# Patient Record
Sex: MALE | Race: WHITE | NOT HISPANIC OR LATINO | Employment: OTHER | URBAN - METROPOLITAN AREA
[De-identification: names, ages, dates, MRNs, and addresses within clinical notes are randomized per-mention and may not be internally consistent; named-entity substitution may affect disease eponyms.]

---

## 2022-01-31 ENCOUNTER — LAB (OUTPATIENT)
Dept: LAB | Facility: HOSPITAL | Age: 57
End: 2022-01-31

## 2022-01-31 ENCOUNTER — HOSPITAL ENCOUNTER (OUTPATIENT)
Dept: ULTRASOUND IMAGING | Facility: HOSPITAL | Age: 57
Discharge: HOME | End: 2022-01-31

## 2022-01-31 ENCOUNTER — OFFICE VISIT (OUTPATIENT)
Dept: GASTROENTEROLOGY | Facility: CLINIC | Age: 57
End: 2022-01-31

## 2022-01-31 VITALS
DIASTOLIC BLOOD PRESSURE: 58 MMHG | SYSTOLIC BLOOD PRESSURE: 123 MMHG | WEIGHT: 271.8 LBS | HEART RATE: 92 BPM | HEIGHT: 72 IN | BODY MASS INDEX: 36.82 KG/M2

## 2022-01-31 DIAGNOSIS — R18.8 CIRRHOSIS OF LIVER WITH ASCITES, UNSPECIFIED HEPATIC CIRRHOSIS TYPE: ICD-10-CM

## 2022-01-31 DIAGNOSIS — R18.8 OTHER ASCITES: ICD-10-CM

## 2022-01-31 DIAGNOSIS — A04.8 H. PYLORI INFECTION: Primary | ICD-10-CM

## 2022-01-31 DIAGNOSIS — A04.8 H. PYLORI INFECTION: ICD-10-CM

## 2022-01-31 DIAGNOSIS — K74.60 CIRRHOSIS OF LIVER WITH ASCITES, UNSPECIFIED HEPATIC CIRRHOSIS TYPE: ICD-10-CM

## 2022-01-31 DIAGNOSIS — R10.11 RIGHT UPPER QUADRANT PAIN: ICD-10-CM

## 2022-01-31 LAB
ALBUMIN SERPL-MCNC: 4 G/DL (ref 3.5–5.2)
ALBUMIN/GLOB SERPL: 1.4 G/DL
ALP SERPL-CCNC: 53 U/L (ref 39–117)
ALT SERPL W P-5'-P-CCNC: 20 U/L (ref 1–41)
AMMONIA BLD-SCNC: 27 UMOL/L (ref 16–60)
ANION GAP SERPL CALCULATED.3IONS-SCNC: 10.5 MMOL/L (ref 5–15)
AST SERPL-CCNC: 21 U/L (ref 1–40)
BASOPHILS # BLD AUTO: 0.03 10*3/MM3 (ref 0–0.2)
BASOPHILS NFR BLD AUTO: 0.4 % (ref 0–1.5)
BILIRUB SERPL-MCNC: 0.5 MG/DL (ref 0–1.2)
BUN SERPL-MCNC: 11 MG/DL (ref 6–20)
BUN/CREAT SERPL: 12.1 (ref 7–25)
CALCIUM SPEC-SCNC: 9.2 MG/DL (ref 8.6–10.5)
CHLORIDE SERPL-SCNC: 106 MMOL/L (ref 98–107)
CO2 SERPL-SCNC: 24.5 MMOL/L (ref 22–29)
CREAT SERPL-MCNC: 0.91 MG/DL (ref 0.76–1.27)
DEPRECATED RDW RBC AUTO: 43.6 FL (ref 37–54)
EOSINOPHIL # BLD AUTO: 0.08 10*3/MM3 (ref 0–0.4)
EOSINOPHIL NFR BLD AUTO: 1.2 % (ref 0.3–6.2)
ERYTHROCYTE [DISTWIDTH] IN BLOOD BY AUTOMATED COUNT: 13.1 % (ref 12.3–15.4)
GFR SERPL CREATININE-BSD FRML MDRD: 86 ML/MIN/1.73
GLOBULIN UR ELPH-MCNC: 2.9 GM/DL
GLUCOSE SERPL-MCNC: 100 MG/DL (ref 65–99)
HCT VFR BLD AUTO: 42.4 % (ref 37.5–51)
HGB BLD-MCNC: 13.7 G/DL (ref 13–17.7)
IMM GRANULOCYTES # BLD AUTO: 0.01 10*3/MM3 (ref 0–0.05)
IMM GRANULOCYTES NFR BLD AUTO: 0.1 % (ref 0–0.5)
INR PPP: 0.96 (ref 2–3)
LYMPHOCYTES # BLD AUTO: 2.17 10*3/MM3 (ref 0.7–3.1)
LYMPHOCYTES NFR BLD AUTO: 32.3 % (ref 19.6–45.3)
MCH RBC QN AUTO: 29 PG (ref 26.6–33)
MCHC RBC AUTO-ENTMCNC: 32.3 G/DL (ref 31.5–35.7)
MCV RBC AUTO: 89.6 FL (ref 79–97)
MONOCYTES # BLD AUTO: 0.49 10*3/MM3 (ref 0.1–0.9)
MONOCYTES NFR BLD AUTO: 7.3 % (ref 5–12)
NEUTROPHILS NFR BLD AUTO: 3.93 10*3/MM3 (ref 1.7–7)
NEUTROPHILS NFR BLD AUTO: 58.7 % (ref 42.7–76)
PLATELET # BLD AUTO: 231 10*3/MM3 (ref 140–450)
PMV BLD AUTO: 9.4 FL (ref 6–12)
POTASSIUM SERPL-SCNC: 4.4 MMOL/L (ref 3.5–5.2)
PROT SERPL-MCNC: 6.9 G/DL (ref 6–8.5)
PROTHROMBIN TIME: 10.1 SECONDS (ref 9.4–12)
RBC # BLD AUTO: 4.73 10*6/MM3 (ref 4.14–5.8)
SODIUM SERPL-SCNC: 141 MMOL/L (ref 136–145)
UREA BREATH TEST QL: NEGATIVE
WBC NRBC COR # BLD: 6.71 10*3/MM3 (ref 3.4–10.8)

## 2022-01-31 PROCEDURE — 99204 OFFICE O/P NEW MOD 45 MIN: CPT | Performed by: NURSE PRACTITIONER

## 2022-01-31 PROCEDURE — 83013 H PYLORI (C-13) BREATH: CPT

## 2022-01-31 PROCEDURE — 36415 COLL VENOUS BLD VENIPUNCTURE: CPT

## 2022-01-31 PROCEDURE — 76700 US EXAM ABDOM COMPLETE: CPT

## 2022-01-31 PROCEDURE — 85025 COMPLETE CBC W/AUTO DIFF WBC: CPT | Performed by: NURSE PRACTITIONER

## 2022-01-31 PROCEDURE — 80053 COMPREHEN METABOLIC PANEL: CPT

## 2022-01-31 PROCEDURE — 82140 ASSAY OF AMMONIA: CPT

## 2022-01-31 PROCEDURE — 85610 PROTHROMBIN TIME: CPT

## 2022-01-31 RX ORDER — CYCLOBENZAPRINE HCL 10 MG
TABLET ORAL 3 TIMES DAILY PRN
COMMUNITY

## 2022-01-31 RX ORDER — PROCHLORPERAZINE 25 MG/1
SUPPOSITORY RECTAL SEE ADMIN INSTRUCTIONS
COMMUNITY
Start: 2021-12-22

## 2022-01-31 RX ORDER — ASPIRIN 81 MG/1
TABLET ORAL
COMMUNITY

## 2022-01-31 RX ORDER — SILDENAFIL 25 MG/1
TABLET, FILM COATED ORAL
COMMUNITY
End: 2022-11-23

## 2022-01-31 RX ORDER — FAMOTIDINE 40 MG/1
TABLET, FILM COATED ORAL
COMMUNITY

## 2022-01-31 RX ORDER — AZELASTINE 1 MG/ML
SPRAY, METERED NASAL
COMMUNITY

## 2022-01-31 RX ORDER — HYDROCODONE BITARTRATE AND ACETAMINOPHEN 10; 325 MG/1; MG/1
TABLET ORAL
COMMUNITY
End: 2022-07-12

## 2022-01-31 RX ORDER — TRIAMCINOLONE ACETONIDE 0.1 %
PASTE (GRAM) DENTAL
COMMUNITY
End: 2022-03-28

## 2022-01-31 RX ORDER — HYDROCORTISONE 25 MG/ML
LOTION TOPICAL
COMMUNITY

## 2022-01-31 RX ORDER — CLOBETASOL PROPIONATE 0.46 MG/ML
SOLUTION TOPICAL
COMMUNITY
Start: 2021-11-04

## 2022-01-31 RX ORDER — TRIAMCINOLONE ACETONIDE 1 MG/G
CREAM TOPICAL
COMMUNITY
End: 2022-11-23

## 2022-01-31 RX ORDER — EPINEPHRINE 0.3 MG/.3ML
INJECTION SUBCUTANEOUS
COMMUNITY

## 2022-01-31 RX ORDER — NITROGLYCERIN 0.4 MG/1
TABLET SUBLINGUAL
COMMUNITY

## 2022-01-31 RX ORDER — PRAMIPEXOLE DIHYDROCHLORIDE 0.12 MG/1
TABLET ORAL
COMMUNITY

## 2022-01-31 RX ORDER — CARBIDOPA AND LEVODOPA 25; 100 MG/1; MG/1
TABLET, EXTENDED RELEASE ORAL
COMMUNITY
End: 2022-07-12

## 2022-01-31 RX ORDER — CETIRIZINE HYDROCHLORIDE 10 MG/1
TABLET ORAL
COMMUNITY

## 2022-01-31 RX ORDER — AMOXICILLIN 500 MG/1
CAPSULE ORAL
COMMUNITY
Start: 2022-01-27 | End: 2022-07-12

## 2022-01-31 RX ORDER — TIOTROPIUM BROMIDE INHALATION SPRAY 1.56 UG/1
SPRAY, METERED RESPIRATORY (INHALATION)
COMMUNITY
Start: 2022-01-19 | End: 2022-11-23

## 2022-01-31 RX ORDER — MAGNESIUM OXIDE 400 MG/1
400 TABLET ORAL
COMMUNITY

## 2022-01-31 RX ORDER — INSULIN ASPART 100 [IU]/ML
INJECTION, SOLUTION INTRAVENOUS; SUBCUTANEOUS
COMMUNITY
End: 2022-11-23

## 2022-01-31 RX ORDER — PROCHLORPERAZINE 25 MG/1
SUPPOSITORY RECTAL
COMMUNITY
Start: 2022-01-17

## 2022-01-31 RX ORDER — LIDOCAINE 50 MG/G
OINTMENT TOPICAL
COMMUNITY
End: 2022-11-23

## 2022-01-31 RX ORDER — GABAPENTIN 400 MG/1
CAPSULE ORAL
COMMUNITY
End: 2022-07-12

## 2022-01-31 RX ORDER — PEN NEEDLE, DIABETIC 32GX 5/32"
NEEDLE, DISPOSABLE MISCELLANEOUS
COMMUNITY
Start: 2022-01-25

## 2022-01-31 RX ORDER — FLUTICASONE PROPIONATE 50 MCG
SPRAY, SUSPENSION (ML) NASAL
COMMUNITY

## 2022-01-31 RX ORDER — APREMILAST 30 MG/1
TABLET, FILM COATED ORAL
COMMUNITY

## 2022-01-31 RX ORDER — KETOCONAZOLE 20 MG/ML
SHAMPOO TOPICAL
COMMUNITY

## 2022-01-31 RX ORDER — INSULIN LISPRO 200 [IU]/ML
INJECTION, SOLUTION SUBCUTANEOUS
COMMUNITY
End: 2022-11-23

## 2022-01-31 RX ORDER — INSULIN DEGLUDEC 200 U/ML
INJECTION, SOLUTION SUBCUTANEOUS
COMMUNITY
End: 2022-11-23

## 2022-01-31 RX ORDER — ALBUTEROL SULFATE 90 UG/1
AEROSOL, METERED RESPIRATORY (INHALATION)
COMMUNITY

## 2022-01-31 RX ORDER — RABEPRAZOLE SODIUM 20 MG/1
20 TABLET, DELAYED RELEASE ORAL DAILY
Qty: 90 TABLET | Refills: 0 | Status: SHIPPED | OUTPATIENT
Start: 2022-01-31 | End: 2022-05-09 | Stop reason: SDUPTHER

## 2022-01-31 RX ORDER — CLOSTRIDIUM TETANI TOXOID ANTIGEN (FORMALDEHYDE INACTIVATED), CORYNEBACTERIUM DIPHTHERIAE TOXOID ANTIGEN (FORMALDEHYDE INACTIVATED), BORDETELLA PERTUSSIS TOXOID ANTIGEN (GLUTARALDEHYDE INACTIVATED), BORDETELLA PERTUSSIS FILAMENTOUS HEMAGGLUTININ ANTIGEN (FORMALDEHYDE INACTIVATED), BORDETELLA PERTUSSIS PERTACTIN ANTIGEN, AND BORDETELLA PERTUSSIS FIMBRIAE 2/3 ANTIGEN 5; 2; 2.5; 5; 3; 5 [LF]/.5ML; [LF]/.5ML; UG/.5ML; UG/.5ML; UG/.5ML; UG/.5ML
INJECTION, SUSPENSION INTRAMUSCULAR
COMMUNITY

## 2022-01-31 RX ORDER — RIVASTIGMINE TARTRATE 4.5 MG/1
CAPSULE ORAL
COMMUNITY

## 2022-01-31 RX ORDER — HEPATITIS A VACCINE 1440 [IU]/ML
INJECTION, SUSPENSION INTRAMUSCULAR
COMMUNITY

## 2022-01-31 RX ORDER — MELOXICAM 15 MG/1
TABLET ORAL
COMMUNITY

## 2022-01-31 RX ORDER — CALCIPOTRIENE 50 UG/G
OINTMENT TOPICAL
COMMUNITY

## 2022-01-31 RX ORDER — BENAZEPRIL HYDROCHLORIDE 10 MG/1
TABLET ORAL
COMMUNITY

## 2022-01-31 RX ORDER — MONTELUKAST SODIUM 10 MG/1
TABLET ORAL
COMMUNITY
End: 2022-11-23

## 2022-01-31 NOTE — PROGRESS NOTES
"Patient Name: Israel Grayson   Visit Date: 01/31/2022   Patient ID: [id number]  Provider: JALEN Link    Sex: male  Location:  Location Address:  Location Phone: [phone]  [facility] [address] [phone]    YOB: 1965      Primary Care Provider Patsy Stevens APRN      Referring Provider: No ref. provider found        Chief Complaint  Abnormal Lab (H. Pylori), Hepatic Disease (Liver Failure, MEHTA ), and Abdominal Pain (URQ )    History of Present Illness  Israel Grayson is a 56 y.o. who presents to [facility] on referral from No ref. provider found for a gastroenterology evaluation of Abnormal Lab (H. Pylori), Hepatic Disease (Liver Failure, MEHTA ), and Abdominal Pain (URQ ).    Mr. Grayson presents today with a complaints of recurrent H. pylori.  Was recently diagnosed several months ago at [facility].  Reports he finished the antibiotics however does not feel that infection has cleared.  Has not yet had a H. pylori breath test.  Admits to feeling bloated most days.  Severe heartburn, all day long, no matter the type of foods he eats.  Drinking 3 cans of Mountain Dew, diet daily.  Occasional nausea.  Reports he will wake up in the melanite at times in need to vomit due to the acid.  Currently taking Pepcid 40 mg.  Has failed Prilosec, Protonix, and Nexium due to no change in symptoms.    Patient also recently had cholecystectomy last year and was told his liver was cirrhotic due to MEHTA after a biopsy.  Admits to a dull right upper quadrant pain.  He is having confusion to where he \"draws blanks\" regarding things he used to do daily.  Patient does also have Parkinson's disease and is seeing a neurologist as well.  Denies any dysphagia, lower extremity swelling, or trouble sleeping at night.  He does feel that his stomach has been more distended in the last several weeks.    Having a bowel movement at least 1-2 times daily, " formed stool.  Denies any hematochezia or melena.      Labs Result Review Imaging    Past Medical History:   Diagnosis Date   • Anxiety    • Chronic headaches    • Cirrhosis (HCC)    • Depression    • Diabetes mellitus (HCC)    • Hyperlipidemia    • Hypertension    • Liver disease     Cirrhosis    • Muscle pain    • Parkinson disease (HCC)        Past Surgical History:   Procedure Laterality Date   • ANAL FISSURECTOMY     • CHOLECYSTECTOMY     • COLONOSCOPY     • HIP ARTHROPLASTY      X2 Left    • LIVER BIOPSY     • POSTERIOR LUMBAR/THORACIC SPINE FUSION     • ROTATOR CUFF REPAIR     • TONSILLECTOMY     • UPPER GASTROINTESTINAL ENDOSCOPY           Current Outpatient Medications:   •  albuterol sulfate  (90 Base) MCG/ACT inhaler, albuterol sulfate HFA 90 mcg/actuation aerosol inhaler  INHALE 2 PUFFS BY MOUTH EVERY 4 TO 6 HOURS AS NEEDED FOR COUGH WHEEZE OR SHORTNESS OF BREATH USE WITH VORTEX SPACER, Disp: , Rfl:   •  amoxicillin (AMOXIL) 500 MG capsule, , Disp: , Rfl:   •  Apremilast (Otezla) 30 MG tablet, Otezla 30 mg tablet  TAKE 2 TABLETS BY MOUTH ONCE DAILY, Disp: , Rfl:   •  aspirin (aspirin) 81 MG EC tablet, Aspir-81 mg tablet,delayed release  Take 1 tablet every day by oral route., Disp: , Rfl:   •  azelastine (ASTELIN) 0.1 % nasal spray, azelastine 137 mcg (0.1 %) nasal spray aerosol  USE 1 TO 2 SPRAY(S) IN EACH NOSTRIL TWICE DAILY AS NEEDED, Disp: , Rfl:   •  BD Pen Needle Gavi 2nd Gen 32G X 4 MM misc, USE 5 TO 7 TIMES A DAY, Disp: , Rfl:   •  benazepril (LOTENSIN) 10 MG tablet, benazepril 10 mg tablet  TAKE 1 TABLET BY MOUTH ONCE DAILY, Disp: , Rfl:   •  calcipotriene (DOVONOX) 0.005 % ointment, calcipotriene 0.005 % topical ointment, Disp: , Rfl:   •  carbidopa-levodopa (SINEMET)  MG per tablet, carbidopa 25 mg-levodopa 100 mg tablet  TAKE 1 TABLET BY MOUTH EVERY DAY AT BEDTIME, Disp: , Rfl:   •  carbidopa-levodopa ER (SINEMET CR)  MG per tablet, carbidopa ER 25 mg-levodopa 100 mg  tablet,extended release  TAKE 1 TABLET BY MOUTH EVERY DAY AT BEDTIME, Disp: , Rfl:   •  cetirizine (zyrTEC) 10 MG tablet, cetirizine 10 mg tablet  TAKE 1 TABLET BY MOUTH ONCE DAILY, Disp: , Rfl:   •  Cholecalciferol 50 MCG (2000 UT) tablet, Take 1 tablet by mouth Daily., Disp: , Rfl:   •  clobetasol (TEMOVATE) 0.05 % external solution, , Disp: , Rfl:   •  Continuous Blood Gluc Sensor (Dexcom G6 Sensor), , Disp: , Rfl:   •  Continuous Blood Gluc Transmit (Dexcom G6 Transmitter) misc, See Admin Instructions., Disp: , Rfl:   •  Cyanocobalamin 1000 MCG sublingual tablet, cyanocobalamin (vit B-12) 1,000 mcg sublingual tablet  Place 1 tablet every day by sublingual route., Disp: , Rfl:   •  cyclobenzaprine (FLEXERIL) 10 MG tablet, 10 mg., Disp: , Rfl:   •  Diclofenac Sodium (VOLTAREN) 1 % gel gel, diclofenac 1 % topical gel  APPLY 2 GRAMS TOPICALLY TO THE AFFECTED AREA(S) FOUR TIMES DAILY, Disp: , Rfl:   •  EPINEPHrine (EPIPEN) 0.3 MG/0.3ML solution auto-injector injection, epinephrine 0.3 mg/0.3 mL injection, auto-injector  USE AS DIRECTED FOR ACUTE ALLERGIC REACTION, Disp: , Rfl:   •  famotidine (PEPCID) 40 MG tablet, famotidine 40 mg tablet  TAKE 1 TABLET BY MOUTH TWICE DAILY, Disp: , Rfl:   •  fluocinonide (LIDEX) 0.05 % cream, Apply  topically to the appropriate area as directed Daily., Disp: , Rfl:   •  fluticasone (FLONASE) 50 MCG/ACT nasal spray, fluticasone propionate 50 mcg/actuation nasal spray,suspension  USE 2 SPRAY(S) IN EACH NOSTRIL ONCE DAILY, Disp: , Rfl:   •  gabapentin (NEURONTIN) 400 MG capsule, gabapentin 400 mg capsule  TAKE 1 CAPSULE BY MOUTH THREE TIMES DAILY, Disp: , Rfl:   •  hepatitis A (Havrix) 1440 EL U/ML vaccine, Havrix (PF) 1,440 DIXON unit/mL intramuscular syringe, Disp: , Rfl:   •  HYDROcodone-acetaminophen (NORCO)  MG per tablet, hydrocodone 10 mg-acetaminophen 325 mg tablet, Disp: , Rfl:   •  hydrocortisone 2.5 % lotion, hydrocortisone 2.5 % lotion  APPLY A THIN LAYER TO THE  AFFECTED AREA(S) BY TOPICAL ROUTE ONCE DAILY, Disp: , Rfl:   •  influenza vac split quad (FLUZONE,FLUARIX,AFLURIA,FLULAVAL) 0.5 ML suspension prefilled syringe injection, Fluarix Quad 0612-3520 (PF) 60 mcg (15 mcg x 4)/0.5 mL IM syringe, Disp: , Rfl:   •  insulin aspart (NovoLOG FlexPen) 100 UNIT/ML solution pen-injector sc pen, Novolog Flexpen U-100 Insulin aspart 100 unit/mL (3 mL) subcutaneous  INJECT 12 UNITS SUBCUTANEOUSLY WITH BREAKFAST, THEN INJECT 10 UNITS WITH LUNCH, THEN INJECT 20 UNITS WITH SUPPER, Disp: , Rfl:   •  Insulin Degludec (Tresiba FlexTouch) 200 UNIT/ML solution pen-injector pen injection, Tresiba FlexTouch U-200 insulin 200 unit/mL (3 mL) subcutaneous pen  INJECT 42 UNITS SUBCUTANEOUSLY IN THE MORNING AND 42 IN THE EVENING FOR DIABETES, Disp: , Rfl:   •  Insulin Lispro (HumaLOG KwikPen) 200 UNIT/ML solution pen-injector, Humalog KwikPen U-200 Insulin 200 unit/mL (3 mL) subcutaneous, Disp: , Rfl:   •  ketoconazole (NIZORAL) 2 % shampoo, ketoconazole 2 % shampoo  APPLY TOPICALLY TO SCALP 2 3 TIMES PER WEEK; APPLY TO DRY SCALP LET IT SIT FOR 15 20 MINUTES THEN RINSE, Disp: , Rfl:   •  lidocaine (XYLOCAINE) 5 % ointment, lidocaine 5 % topical ointment, Disp: , Rfl:   •  magnesium oxide (MAG-OX) 400 MG tablet, 400 mg., Disp: , Rfl:   •  meloxicam (MOBIC) 15 MG tablet, meloxicam 15 mg tablet  TAKE 1 TABLET BY MOUTH ONCE DAILY, Disp: , Rfl:   •  montelukast (SINGULAIR) 10 MG tablet, montelukast 10 mg tablet, Disp: , Rfl:   •  mupirocin (BACTROBAN) 2 % ointment, mupirocin 2 % topical ointment, Disp: , Rfl:   •  nitroglycerin (NITROSTAT) 0.4 MG SL tablet, nitroglycerin 0.4 mg sublingual tablet, Disp: , Rfl:   •  pramipexole (MIRAPEX) 0.125 MG tablet, pramipexole 0.125 mg tablet  TAKE 1 TABLET BY MOUTH TWICE DAILY, Disp: , Rfl:   •  rivastigmine (EXELON) 4.5 MG capsule, rivastigmine 4.5 mg capsule, Disp: , Rfl:   •  sildenafil (VIAGRA) 25 MG tablet, sildenafil 25 mg tablet  Take 1 tablet every day by  "oral route., Disp: , Rfl:   •  SITagliptin (Januvia) 100 MG tablet, Januvia 100 mg tablet  TAKE 1 TABLET BY MOUTH ONCE DAILY FOR DIABETES, Disp: , Rfl:   •  Spiriva Respimat 1.25 MCG/ACT aerosol solution inhaler, INHALE 2 SPRAY(S) BY MOUTH ONCE DAILY, Disp: , Rfl:   •  Tdap (Adacel) 5-2-15.5 LF-MCG/0.5 injection, Adacel (Tdap Adolesn/Adult)(PF)2 Lf-(2.5-5-3-5)-5 Lf/0.5 mL IM syringe, Disp: , Rfl:   •  triamcinolone (KENALOG) 0.1 % cream, triamcinolone acetonide 0.1 % topical cream  APPLY CREAM EXTERNALLY TO AFFECTED AREAS TWICE DAILY FOR NO MORE THAN 2 WEEKS PER MONTH, Disp: , Rfl:   •  triamcinolone (KENALOG) 0.1 % paste, triamcinolone acetonide 0.1 % dental paste  APPLY ORALLY THREE TIMES DAILY TO ULCER, Disp: , Rfl:   •  RABEprazole (ACIPHEX) 20 MG EC tablet, Take 1 tablet by mouth Daily., Disp: 90 tablet, Rfl: 0  •  riFAXIMin (Xifaxan) 550 MG tablet, Take 1 tablet by mouth Every 12 (Twelve) Hours for 90 days., Disp: 180 tablet, Rfl: 1     No Known Allergies    History reviewed. No pertinent family history.     Social History     Social History Narrative   • Not on file         Objective     Review of Systems   Constitutional: Negative for appetite change and unexpected weight loss.   Gastrointestinal: Positive for abdominal distention, abdominal pain, nausea and GERD.        Vital Signs:   /58 (BP Location: Left arm, Patient Position: Sitting, Cuff Size: Large Adult)   Pulse 92   Ht 182.9 cm (72\")   Wt 123 kg (271 lb 12.8 oz)   BMI 36.86 kg/m²       Physical Exam  Constitutional:       General: He is not in acute distress.     Appearance: Normal appearance. He is well-developed and normal weight.   HENT:      Head: Normocephalic and atraumatic.   Eyes:      Conjunctiva/sclera: Conjunctivae normal.      Pupils: Pupils are equal, round, and reactive to light.      Visual Fields: Right eye visual fields normal and left eye visual fields normal.   Cardiovascular:      Rate and Rhythm: Normal rate and " regular rhythm.      Heart sounds: Normal heart sounds.   Pulmonary:      Effort: Pulmonary effort is normal. No retractions.      Breath sounds: Normal breath sounds and air entry.   Abdominal:      General: Bowel sounds are normal. There is distension.      Palpations: Abdomen is soft.      Tenderness: There is no abdominal tenderness.      Comments: No appreciable hepatosplenomegaly or ascites   Musculoskeletal:         General: Normal range of motion.      Cervical back: Normal range of motion and neck supple.   Skin:     General: Skin is warm and dry.   Neurological:      Mental Status: He is alert and oriented to person, place, and time.   Psychiatric:         Mood and Affect: Mood and affect normal.         Behavior: Behavior normal.         Result Review :   The following data was reviewed by: JALEN Link on 01/31/2022:    CBC w/diff    CBC w/Diff 7/9/21 7/10/21 7/11/21   WBC 7.07 6.98 5.30   RBC 4.54 4.51 4.54   Hemoglobin 12.9 (A) 12.9 (A) 12.9 (A)   Hematocrit 39.1 (A) 39.3 (A) 39.6 (A)   MCV 86.1 87.1 87.2   MCH 28.4 28.6 28.4   MCHC 33.0 32.8 32.6   RDW 12.9 13.0 13.0   Platelets 216 206 198   Neutrophil Rel % 51.9 42.6 (A) 48.6   Immature Granulocyte Rel % 0.6 0.3 0.4   Lymphocyte Rel % 38.2 47.6 40.6   Monocyte Rel % 7.2 6.6 6.8   Eosinophil Rel % 1.7 2.3 3.0   Basophil Rel % 0.4 0.6 0.6   (A) Abnormal value            No results found for: IRON, TIBC, FERRITIN, LABIRON      EGD 9/28/2021: Gastroesophageal reflux disease without esophagitis; H. pylori gastritis.  Stomach biopsy-gastric antral mucosa with chronic superficial gastritis. Positive for H. pylori.    CT the abdomen pelvis with contrast 12/5/2021: Mild distention of a few fluid-filled loops of small bowel in the left hemiabdomen, some of which demonstrate mild wall thickening. Stomach is decompressed. Moderate to large volume of stool in the colon. Left-sided colonic diverticulosis. Recommend strict adherence with screening  colonoscopy. Mild morphologic changes of chronic liver disease. Hepatic steatosis.    HIDA scan 12/6/2021: Normal    Colon screening 4/3/2018: Normal colonoscopy.     Assessment and Plan    Diagnoses and all orders for this visit:    1. H. pylori infection (Primary)  -     H. Pylori Breath Test - Breath, Lung; Future    2. Cirrhosis of liver with ascites, unspecified hepatic cirrhosis type (HCC)  -     Ammonia; Future  -     CBC & Differential  -     Comprehensive Metabolic Panel; Future  -     Protime-INR; Future  -     Ambulatory Referral to Hepatology  -     US Abdomen Complete; Future    3. Other ascites  -     Ammonia; Future  -     CBC & Differential  -     Comprehensive Metabolic Panel; Future  -     Protime-INR; Future  -     US Abdomen Complete; Future    4. Right upper quadrant pain  -     US Abdomen Complete; Future    Other orders  -     riFAXIMin (Xifaxan) 550 MG tablet; Take 1 tablet by mouth Every 12 (Twelve) Hours for 90 days.  Dispense: 180 tablet; Refill: 1  -     RABEprazole (ACIPHEX) 20 MG EC tablet; Take 1 tablet by mouth Daily.  Dispense: 90 tablet; Refill: 0      * Surgery not found *     Cirrhosis: MEHTA.  Ascites: Patient does seem to be distended today, ordering abdominal ultrasound.  Varices: None noted on last EGD will be due for screening of 9/2023  Encephalopathy: He does note confusion.  Will order Xifaxan today.  Health maintenance.  Due for colon cancer screening 4/2028.    Follow Up   Return in about 2 months (around 3/31/2022).  Patient was given instructions and counseling regarding his condition or for health maintenance advice. Please see specific information pulled into the AVS if appropriate.

## 2022-02-04 ENCOUNTER — TELEPHONE (OUTPATIENT)
Dept: GASTROENTEROLOGY | Facility: CLINIC | Age: 57
End: 2022-02-04

## 2022-02-04 NOTE — TELEPHONE ENCOUNTER
----- Message from JALEN Link sent at 2/1/2022 11:39 AM EST -----  Please let patient know that there was no ascites (collection of fluid within the abdomen) seen.

## 2022-02-17 ENCOUNTER — TELEPHONE (OUTPATIENT)
Dept: GASTROENTEROLOGY | Facility: CLINIC | Age: 57
End: 2022-02-17

## 2022-02-17 NOTE — TELEPHONE ENCOUNTER
Patient's wife called and left a voicemail stating that this patient has had a horrible headache for 2 months on and off. Patient's wife said that he cannot take nsaids because of his liver and is requesting to know if something can be prescribed or what he can take to help with his headaches. Please advise.

## 2022-02-18 NOTE — TELEPHONE ENCOUNTER
Tylenol is actually what we avoid for the liver as it is excreted through the liver.  However patient also has a history of H. pylori so we have to be careful with NSAIDs as well however if his headache is not that bad I would recommend he goes on and takes ibuprofen or Excedrin however please take with food.

## 2022-03-01 ENCOUNTER — TELEPHONE (OUTPATIENT)
Dept: GASTROENTEROLOGY | Facility: CLINIC | Age: 57
End: 2022-03-01

## 2022-03-01 RX ORDER — LACTULOSE 10 G/15ML
15-30 SOLUTION ORAL 2 TIMES DAILY
Qty: 900 ML | Refills: 5 | Status: SHIPPED | OUTPATIENT
Start: 2022-03-01 | End: 2022-10-06 | Stop reason: SDUPTHER

## 2022-03-01 NOTE — TELEPHONE ENCOUNTER
Please let patient know insurance is denying approval of Xifaxan.  I have sent in lactulose to his pharmacy.

## 2022-03-01 NOTE — TELEPHONE ENCOUNTER
I submitted a PA request for the Xifaxan and received a denial stating he must first try and fail Lactulose or Neomycin

## 2022-03-02 ENCOUNTER — TELEPHONE (OUTPATIENT)
Dept: GASTROENTEROLOGY | Facility: CLINIC | Age: 57
End: 2022-03-02

## 2022-03-28 ENCOUNTER — OFFICE VISIT (OUTPATIENT)
Dept: GASTROENTEROLOGY | Facility: CLINIC | Age: 57
End: 2022-03-28

## 2022-03-28 VITALS
SYSTOLIC BLOOD PRESSURE: 154 MMHG | HEIGHT: 72 IN | BODY MASS INDEX: 37.9 KG/M2 | DIASTOLIC BLOOD PRESSURE: 78 MMHG | OXYGEN SATURATION: 98 % | WEIGHT: 279.8 LBS | HEART RATE: 96 BPM

## 2022-03-28 DIAGNOSIS — K74.69 OTHER CIRRHOSIS OF LIVER: Primary | ICD-10-CM

## 2022-03-28 DIAGNOSIS — R10.11 RIGHT UPPER QUADRANT PAIN: ICD-10-CM

## 2022-03-28 PROCEDURE — 99214 OFFICE O/P EST MOD 30 MIN: CPT | Performed by: NURSE PRACTITIONER

## 2022-03-28 RX ORDER — FOLIC ACID 1 MG/1
TABLET ORAL
COMMUNITY

## 2022-03-28 RX ORDER — CITALOPRAM 20 MG/1
20 TABLET ORAL DAILY
COMMUNITY
Start: 2022-03-14

## 2022-03-28 RX ORDER — MAGNESIUM CARB/ALUMINUM HYDROX 105-160MG
TABLET,CHEWABLE ORAL
Status: ON HOLD | COMMUNITY
Start: 2022-02-28 | End: 2022-11-29

## 2022-03-28 NOTE — PROGRESS NOTES
Chief Complaint  Cirrhosis (Follow up cirrhosis, right side pain and left kidney pain)    History of Present Illness  Edgardo Grayson is a 56 y.o. who presents to [facility] for follow up of Cirrhosis (Follow up cirrhosis, right side pain and left kidney pain).    Mr. Grayson reports that he has 2 broken ribs on right side of the chest.  Patient is unsure how he broke his ribs as he does not report any falls or recent trauma.  Currently following his PCP for this.  Has been having right upper quadrant pain.  Occasional nausea without vomiting.  Avoiding spicy and acidic foods.  Feels that heartburn is controlled with Prilosec and Pepcid.  Recent H. pylori breath test negative.  Denies dysphagia.  Appetite and weight stable.    Patient reports having a bowel movement multiple times a day with lactulose twice daily.  Denies any hematochezia or melena.    Denies any new confusion it is unrelated to the Parkinson disease or falls.  Admits to swelling in his feet at times and feels that his shoes are tight.  Wife expresses he is unable to be on diuretics due to kidney problems.  Denies ascites.    Labs Result Review Imaging    Past Medical History:   Diagnosis Date   • Anxiety    • Chronic headaches    • Cirrhosis (HCC)    • Depression    • Diabetes mellitus (HCC)    • Hyperlipidemia    • Hypertension    • Liver disease     Cirrhosis    • Muscle pain    • Parkinson disease (HCC)        Past Surgical History:   Procedure Laterality Date   • ANAL FISSURECTOMY     • CHOLECYSTECTOMY     • COLONOSCOPY     • HIP ARTHROPLASTY      X2 Left    • LIVER BIOPSY     • POSTERIOR LUMBAR/THORACIC SPINE FUSION     • ROTATOR CUFF REPAIR     • TONSILLECTOMY     • UPPER GASTROINTESTINAL ENDOSCOPY         Current Outpatient Medications on File Prior to Visit   Medication Sig Dispense Refill   • albuterol sulfate  (90 Base) MCG/ACT inhaler albuterol sulfate HFA 90 mcg/actuation aerosol inhaler   INHALE 2  PUFFS BY MOUTH EVERY 4 TO 6 HOURS AS NEEDED FOR COUGH WHEEZE OR SHORTNESS OF BREATH USE WITH VORTEX SPACER     • Apremilast (Otezla) 30 MG tablet Otezla 30 mg tablet   TAKE 2 TABLETS BY MOUTH ONCE DAILY     • aspirin 81 MG EC tablet Aspir-81 mg tablet,delayed release   Take 1 tablet every day by oral route.     • azelastine (ASTELIN) 0.1 % nasal spray azelastine 137 mcg (0.1 %) nasal spray aerosol   USE 1 TO 2 SPRAY(S) IN EACH NOSTRIL TWICE DAILY AS NEEDED     • BD Pen Needle Gavi 2nd Gen 32G X 4 MM misc USE 5 TO 7 TIMES A DAY     • benazepril (LOTENSIN) 10 MG tablet benazepril 10 mg tablet   TAKE 1 TABLET BY MOUTH ONCE DAILY     • calcipotriene (DOVONOX) 0.005 % ointment calcipotriene 0.005 % topical ointment     • carbidopa-levodopa (SINEMET)  MG per tablet carbidopa 25 mg-levodopa 100 mg tablet   TAKE 1 TABLET BY MOUTH EVERY DAY AT BEDTIME     • cetirizine (zyrTEC) 10 MG tablet cetirizine 10 mg tablet   TAKE 1 TABLET BY MOUTH ONCE DAILY     • Cholecalciferol 50 MCG (2000 UT) tablet Take 1 tablet by mouth Daily.     • citalopram (CeleXA) 20 MG tablet Take 20 mg by mouth Daily.     • clobetasol (TEMOVATE) 0.05 % external solution      • Continuous Blood Gluc Sensor (Dexcom G6 Sensor)      • Continuous Blood Gluc Transmit (Dexcom G6 Transmitter) misc See Admin Instructions.     • Cyanocobalamin 1000 MCG sublingual tablet cyanocobalamin (vit B-12) 1,000 mcg sublingual tablet   Place 1 tablet every day by sublingual route.     • Diclofenac Sodium (VOLTAREN) 1 % gel gel diclofenac 1 % topical gel   APPLY 2 GRAMS TOPICALLY TO THE AFFECTED AREA(S) FOUR TIMES DAILY     • EPINEPHrine (EPIPEN) 0.3 MG/0.3ML solution auto-injector injection epinephrine 0.3 mg/0.3 mL injection, auto-injector   USE AS DIRECTED FOR ACUTE ALLERGIC REACTION     • famotidine (PEPCID) 40 MG tablet famotidine 40 mg tablet   TAKE 1 TABLET BY MOUTH TWICE DAILY     • fluocinonide (LIDEX) 0.05 % cream Apply  topically to the appropriate area as  directed Daily.     • fluticasone (FLONASE) 50 MCG/ACT nasal spray fluticasone propionate 50 mcg/actuation nasal spray,suspension   USE 2 SPRAY(S) IN EACH NOSTRIL ONCE DAILY     • folic acid (FOLVITE) 1 MG tablet folic acid 1 mg tablet     • gabapentin (NEURONTIN) 400 MG capsule gabapentin 400 mg capsule   TAKE 1 CAPSULE BY MOUTH THREE TIMES DAILY     • hepatitis A (Havrix) 1440 EL U/ML vaccine Havrix (PF) 1,440 DIXON unit/mL intramuscular syringe     • hydrocortisone 2.5 % lotion hydrocortisone 2.5 % lotion   APPLY A THIN LAYER TO THE AFFECTED AREA(S) BY TOPICAL ROUTE ONCE DAILY     • influenza vac split quad (FLUZONE,FLUARIX,AFLURIA,FLULAVAL) 0.5 ML suspension prefilled syringe injection Fluarix Quad 6529-0860 (PF) 60 mcg (15 mcg x 4)/0.5 mL IM syringe     • insulin aspart (NovoLOG FlexPen) 100 UNIT/ML solution pen-injector sc pen Novolog Flexpen U-100 Insulin aspart 100 unit/mL (3 mL) subcutaneous   INJECT 12 UNITS SUBCUTANEOUSLY WITH BREAKFAST, THEN INJECT 10 UNITS WITH LUNCH, THEN INJECT 20 UNITS WITH SUPPER     • Insulin Degludec (Tresiba FlexTouch) 200 UNIT/ML solution pen-injector pen injection Tresiba FlexTouch U-200 insulin 200 unit/mL (3 mL) subcutaneous pen   INJECT 42 UNITS SUBCUTANEOUSLY IN THE MORNING AND 42 IN THE EVENING FOR DIABETES     • Insulin Lispro (HumaLOG KwikPen) 200 UNIT/ML solution pen-injector Humalog KwikPen U-200 Insulin 200 unit/mL (3 mL) subcutaneous     • ketoconazole (NIZORAL) 2 % shampoo ketoconazole 2 % shampoo   APPLY TOPICALLY TO SCALP 2 3 TIMES PER WEEK; APPLY TO DRY SCALP LET IT SIT FOR 15 20 MINUTES THEN RINSE     • lactulose (CHRONULAC) 10 GM/15ML solution Take 15-30 mL by mouth 2 (Two) Times a Day. Goal of 2-3 bowel movements per day. 900 mL 5   • lidocaine (XYLOCAINE) 5 % ointment lidocaine 5 % topical ointment     • magnesium citrate 1.745 GM/30ML solution solution      • magnesium oxide (MAG-OX) 400 MG tablet 400 mg.     • meloxicam (MOBIC) 15 MG tablet meloxicam 15 mg  tablet   TAKE 1 TABLET BY MOUTH ONCE DAILY     • montelukast (SINGULAIR) 10 MG tablet montelukast 10 mg tablet     • mupirocin (BACTROBAN) 2 % ointment mupirocin 2 % topical ointment     • nitroglycerin (NITROSTAT) 0.4 MG SL tablet nitroglycerin 0.4 mg sublingual tablet     • pramipexole (MIRAPEX) 0.125 MG tablet pramipexole 0.125 mg tablet   TAKE 1 TABLET BY MOUTH TWICE DAILY     • RABEprazole (ACIPHEX) 20 MG EC tablet Take 1 tablet by mouth Daily. 90 tablet 0   • riFAXIMin (Xifaxan) 550 MG tablet Take 1 tablet by mouth Every 12 (Twelve) Hours for 90 days. 180 tablet 1   • rivastigmine (EXELON) 4.5 MG capsule rivastigmine 4.5 mg capsule     • sildenafil (VIAGRA) 25 MG tablet sildenafil 25 mg tablet   Take 1 tablet every day by oral route.     • SITagliptin (JANUVIA) 100 MG tablet Januvia 100 mg tablet   TAKE 1 TABLET BY MOUTH ONCE DAILY FOR DIABETES     • Spiriva Respimat 1.25 MCG/ACT aerosol solution inhaler INHALE 2 SPRAY(S) BY MOUTH ONCE DAILY     • Tdap (Adacel) 5-2-15.5 LF-MCG/0.5 injection Adacel (Tdap Adolesn/Adult)(PF)2 Lf-(2.5-5-3-5)-5 Lf/0.5 mL IM syringe     • triamcinolone (KENALOG) 0.1 % cream triamcinolone acetonide 0.1 % topical cream   APPLY CREAM EXTERNALLY TO AFFECTED AREAS TWICE DAILY FOR NO MORE THAN 2 WEEKS PER MONTH     • ubrogepant (UBRELVY) 100 MG tablet Take 100 mg by mouth.     • amoxicillin (AMOXIL) 500 MG capsule      • carbidopa-levodopa ER (SINEMET CR)  MG per tablet carbidopa ER 25 mg-levodopa 100 mg tablet,extended release   TAKE 1 TABLET BY MOUTH EVERY DAY AT BEDTIME     • cyclobenzaprine (FLEXERIL) 10 MG tablet 10 mg.     • HYDROcodone-acetaminophen (NORCO)  MG per tablet hydrocodone 10 mg-acetaminophen 325 mg tablet     • [DISCONTINUED] triamcinolone (KENALOG) 0.1 % paste triamcinolone acetonide 0.1 % dental paste   APPLY ORALLY THREE TIMES DAILY TO ULCER       No current facility-administered medications on file prior to visit.       Social History     Social History  "Narrative   • Not on file         Objective     Review of Systems   Constitutional: Negative for appetite change and unexpected weight loss.   Gastrointestinal: Positive for abdominal pain.        Vital Signs:   /78 (BP Location: Left arm, Patient Position: Sitting, Cuff Size: Large Adult)   Pulse 96   Ht 182.9 cm (72\")   Wt 127 kg (279 lb 12.8 oz)   SpO2 98%   BMI 37.95 kg/m²       Physical Exam  Constitutional:       General: He is not in acute distress.     Appearance: Normal appearance. He is well-developed and normal weight.   HENT:      Head: Normocephalic and atraumatic.   Eyes:      Conjunctiva/sclera: Conjunctivae normal.      Pupils: Pupils are equal, round, and reactive to light.      Visual Fields: Right eye visual fields normal and left eye visual fields normal.   Cardiovascular:      Rate and Rhythm: Normal rate and regular rhythm.      Heart sounds: Normal heart sounds.   Pulmonary:      Effort: Pulmonary effort is normal. No retractions.      Breath sounds: Normal breath sounds and air entry.   Abdominal:      General: Bowel sounds are normal. There is no distension.      Palpations: Abdomen is soft.      Tenderness: There is abdominal tenderness in the right upper quadrant.      Comments: No appreciable hepatosplenomegaly or ascites   Musculoskeletal:         General: Normal range of motion.      Cervical back: Normal range of motion and neck supple.   Skin:     General: Skin is warm and dry.   Neurological:      Mental Status: He is alert and oriented to person, place, and time.   Psychiatric:         Mood and Affect: Mood and affect normal.         Behavior: Behavior normal.         Result Review :   The following data was reviewed by: JALEN Link on 03/28/2022:  CBC w/diff    CBC w/Diff 7/10/21 7/11/21 1/31/22   WBC 6.98 5.30 6.71   RBC 4.51 4.54 4.73   Hemoglobin 12.9 (A) 12.9 (A) 13.7   Hematocrit 39.3 (A) 39.6 (A) 42.4   MCV 87.1 87.2 89.6   MCH 28.6 28.4 29.0   MCHC " 32.8 32.6 32.3   RDW 13.0 13.0 13.1   Platelets 206 198 231   Neutrophil Rel % 42.6 (A) 48.6 58.7   Immature Granulocyte Rel % 0.3 0.4 0.1   Lymphocyte Rel % 47.6 40.6 32.3   Monocyte Rel % 6.6 6.8 7.3   Eosinophil Rel % 2.3 3.0 1.2   Basophil Rel % 0.6 0.6 0.4   (A) Abnormal value            CMP    CMP 1/31/22   Glucose 100 (A)   BUN 11   Creatinine 0.91   eGFR Non African Am 86   Sodium 141   Potassium 4.4   Chloride 106   Calcium 9.2   Albumin 4.00   Total Bilirubin 0.5   Alkaline Phosphatase 53   AST (SGOT) 21   ALT (SGPT) 20   (A) Abnormal value            Protime   Date Value Ref Range Status   01/31/2022 10.1 9.4 - 12.0 Seconds Final     INR   Date Value Ref Range Status   01/31/2022 0.96 (L) 2.00 - 3.00 Final     Ammonia   Date Value Ref Range Status   01/31/2022 27 16 - 60 umol/L Final        Abdominal ultrasound 1/31/2022: Coarsened hepatic echotexture which can be seen with underlying cirrhosis or hepatitis.  No focal liver lesion is present.  Splenomegaly which can also be seen with portal hypertension.     Assessment and Plan    Diagnoses and all orders for this visit:    1. Other cirrhosis of liver (HCC) (Primary)  -     US Liver; Future  -     CBC & Differential; Future  -     Comprehensive Metabolic Panel; Future  -     Protime-INR; Future  -     Ammonia; Future    2. Right upper quadrant pain      * Surgery not found *     Cirrhosis: MEHTA  Ascites: None noted  Varices: None on most recent EGD noted will be due for screening on 9/2023  Encephalopathy: No new confusion is unrelated to Parkinson disease or falls.  Patient does continue lactulose however  Health maintenance: Due for colon cancer screening 4/2028      Right upper quadrant pain continues after healing of the ribs will consider sooner imaging.    Educated patient the goal is to have a bowel movement least twice daily and if he is having that he does not need to use the lactulose twice daily since no confusion is noted.    Follow Up    Return in about 6 months (around 9/28/2022).  Patient was given instructions and counseling regarding his condition or for health maintenance advice. Please see specific information pulled into the AVS if appropriate.

## 2022-05-06 ENCOUNTER — TELEPHONE (OUTPATIENT)
Dept: GASTROENTEROLOGY | Facility: CLINIC | Age: 57
End: 2022-05-06

## 2022-05-06 NOTE — TELEPHONE ENCOUNTER
Patient called and reported that he is taking his lactulose 30ml BID however he is starting to get constipated and was told to call and let our office know. Please advise

## 2022-05-09 RX ORDER — RABEPRAZOLE SODIUM 20 MG/1
20 TABLET, DELAYED RELEASE ORAL DAILY
Qty: 90 TABLET | Refills: 0 | Status: SHIPPED | OUTPATIENT
Start: 2022-05-09 | End: 2022-07-12

## 2022-06-13 ENCOUNTER — TELEPHONE (OUTPATIENT)
Dept: GASTROENTEROLOGY | Facility: CLINIC | Age: 57
End: 2022-06-13

## 2022-06-13 NOTE — TELEPHONE ENCOUNTER
Pt called and stated that he needed to be seen earlier then his appt on 10/10 per his pcp. Please Advise

## 2022-06-16 NOTE — TELEPHONE ENCOUNTER
I tried to review his chart to find abnormal CT scan however I do not see one containing a bowel blockage.  Can you please request these records

## 2022-06-16 NOTE — TELEPHONE ENCOUNTER
Patient left a voicemail stating that per his PCP, he need to be seen with us asap due to abnormal CT scan at the hospital. Patient was told that he has a bowel blockage and has hepatitis A. Patient stated that he has strong pain in his left side. Patient stated if he cannot be seen sooner than October he will most likely need to find a new provider. Please advise.

## 2022-07-05 ENCOUNTER — TELEPHONE (OUTPATIENT)
Dept: GASTROENTEROLOGY | Facility: CLINIC | Age: 57
End: 2022-07-05

## 2022-07-05 NOTE — TELEPHONE ENCOUNTER
----- Message from JALEN Link sent at 7/1/2022 10:40 AM EDT -----  Looks like this imaging was done last December.  It does show mild inflammation with an associated ileus.  Is Patient still having abdominal pain?  If so I would like to repeat a CT scan with contrast since this  has been over 7 months ago.

## 2022-07-07 NOTE — TELEPHONE ENCOUNTER
Patient was notified of CT results. Patient stated that he is still have left side abdominal pain under his rib area that has been there since January. Patient stated that everyone else he has seen is at a loss. Please advise

## 2022-07-11 NOTE — TELEPHONE ENCOUNTER
Patient's follow up is in October and patient stated that that was an awful lot of pain to deal with for 3 months. Please advise

## 2022-07-12 ENCOUNTER — LAB (OUTPATIENT)
Dept: LAB | Facility: HOSPITAL | Age: 57
End: 2022-07-12

## 2022-07-12 ENCOUNTER — OFFICE VISIT (OUTPATIENT)
Dept: GASTROENTEROLOGY | Facility: CLINIC | Age: 57
End: 2022-07-12

## 2022-07-12 VITALS
BODY MASS INDEX: 40.39 KG/M2 | HEART RATE: 88 BPM | DIASTOLIC BLOOD PRESSURE: 77 MMHG | HEIGHT: 72 IN | SYSTOLIC BLOOD PRESSURE: 150 MMHG | WEIGHT: 298.2 LBS

## 2022-07-12 DIAGNOSIS — K74.60 NON-ALCOHOLIC CIRRHOSIS: Primary | ICD-10-CM

## 2022-07-12 DIAGNOSIS — R11.0 NAUSEA: ICD-10-CM

## 2022-07-12 DIAGNOSIS — R10.12 LEFT UPPER QUADRANT ABDOMINAL PAIN: ICD-10-CM

## 2022-07-12 DIAGNOSIS — R12 HEARTBURN: ICD-10-CM

## 2022-07-12 DIAGNOSIS — R18.8 OTHER ASCITES: ICD-10-CM

## 2022-07-12 DIAGNOSIS — R19.5 CHANGE IN STOOL: ICD-10-CM

## 2022-07-12 LAB — LIPASE SERPL-CCNC: 66 U/L (ref 13–60)

## 2022-07-12 PROCEDURE — 82390 ASSAY OF CERULOPLASMIN: CPT | Performed by: PAIN MEDICINE

## 2022-07-12 PROCEDURE — 83690 ASSAY OF LIPASE: CPT

## 2022-07-12 PROCEDURE — 36415 COLL VENOUS BLD VENIPUNCTURE: CPT

## 2022-07-12 PROCEDURE — 82728 ASSAY OF FERRITIN: CPT | Performed by: PAIN MEDICINE

## 2022-07-12 PROCEDURE — 83540 ASSAY OF IRON: CPT | Performed by: PAIN MEDICINE

## 2022-07-12 PROCEDURE — 80053 COMPREHEN METABOLIC PANEL: CPT | Performed by: PAIN MEDICINE

## 2022-07-12 PROCEDURE — 99214 OFFICE O/P EST MOD 30 MIN: CPT | Performed by: NURSE PRACTITIONER

## 2022-07-12 PROCEDURE — 84466 ASSAY OF TRANSFERRIN: CPT | Performed by: PAIN MEDICINE

## 2022-07-12 RX ORDER — GABAPENTIN 300 MG/1
300 CAPSULE ORAL DAILY
COMMUNITY
Start: 2022-06-05

## 2022-07-12 RX ORDER — POLYETHYLENE GLYCOL 3350, SODIUM CHLORIDE, SODIUM BICARBONATE, POTASSIUM CHLORIDE 420; 11.2; 5.72; 1.48 G/4L; G/4L; G/4L; G/4L
4000 POWDER, FOR SOLUTION ORAL ONCE
Qty: 4000 ML | Refills: 0 | Status: SHIPPED | OUTPATIENT
Start: 2022-07-12 | End: 2022-07-12

## 2022-07-12 RX ORDER — DEXLANSOPRAZOLE 60 MG/1
60 CAPSULE, DELAYED RELEASE ORAL DAILY
Qty: 30 CAPSULE | Refills: 5 | Status: SHIPPED | OUTPATIENT
Start: 2022-07-12 | End: 2022-08-23

## 2022-07-12 RX ORDER — BENAZEPRIL HYDROCHLORIDE 5 MG/1
TABLET, FILM COATED ORAL
COMMUNITY
Start: 2022-06-05 | End: 2022-11-23

## 2022-07-12 RX ORDER — ALBUMIN (HUMAN) 12.5 G/50ML
8 SOLUTION INTRAVENOUS ONCE
Status: CANCELLED | OUTPATIENT
Start: 2022-07-12

## 2022-07-12 RX ORDER — BACLOFEN 10 MG/1
TABLET ORAL
COMMUNITY
Start: 2022-04-07

## 2022-07-12 NOTE — PROGRESS NOTES
"  Chief Complaint  Follow-up (Abdominal Pain, Left rib ), Hepatitis A, Altered Mental Status, and Nausea    History of Present Illness  Edgardo Grayson is a 56 y.o. who presents to [facility] for follow up of Follow-up (Abdominal Pain, Left rib ), Hepatitis A, Altered Mental Status, and Nausea     Mr. Grayson presents today for further evaluation of LUQ pain. Reports the pain is constant unless he lays back and takes pressure off of it. Sitting upright such as driving a car increases pressure. An ice pack does help decrease the pain. Reports his skin will turn blue due to keeping the ice on for so long. Expresses he is ready for someone to just \"cut me open and figure it out\". Unable to work. Wife reports he also developed sudden nausea in the last few months. Denies any vomiting or dysphagia. Continues to have heartburn despite taking achiphex and pepcid daily. Waking up in the middle of the night often to due \"vomit in throat\" or pressure in chest. He has now failed prilosec, protonix, prevacid, nexium, and aciphex. Appetite stable. Weight has increased by 20 pounds since March and he is unsure why.  Admits to becoming short of breath more often.    Bowel movement pattern has changed to 1-3 times a day to only a few days a week.  Has increased lactulose to 30ML 2-3x/day. Stool now looks like a \"ribbon\" and is very thin. Took a bottle of magnesium citrate a few months ago and did notice some relief of abdominal pain.  Denies any hematochezia or melena.    Labs Result Review Imaging    Past Medical History:   Diagnosis Date   • Anxiety    • Chronic headaches    • Cirrhosis (HCC)    • Depression    • Diabetes mellitus (HCC)    • Hyperlipidemia    • Hypertension    • Liver disease     Cirrhosis    • Muscle pain    • Parkinson disease (HCC)    • Viral hepatitis A without hepatic coma        Past Surgical History:   Procedure Laterality Date   • ANAL FISSURECTOMY     • CHOLECYSTECTOMY     • " COLONOSCOPY     • HIP ARTHROPLASTY      X2 Left    • LIVER BIOPSY     • POSTERIOR LUMBAR/THORACIC SPINE FUSION     • ROTATOR CUFF REPAIR     • TONSILLECTOMY     • UPPER GASTROINTESTINAL ENDOSCOPY         Current Outpatient Medications on File Prior to Visit   Medication Sig Dispense Refill   • albuterol sulfate  (90 Base) MCG/ACT inhaler albuterol sulfate HFA 90 mcg/actuation aerosol inhaler   INHALE 2 PUFFS BY MOUTH EVERY 4 TO 6 HOURS AS NEEDED FOR COUGH WHEEZE OR SHORTNESS OF BREATH USE WITH VORTEX SPACER     • Apremilast (Otezla) 30 MG tablet Otezla 30 mg tablet   TAKE 2 TABLETS BY MOUTH ONCE DAILY     • aspirin 81 MG EC tablet Aspir-81 mg tablet,delayed release   Take 1 tablet every day by oral route.     • azelastine (ASTELIN) 0.1 % nasal spray azelastine 137 mcg (0.1 %) nasal spray aerosol   USE 1 TO 2 SPRAY(S) IN EACH NOSTRIL TWICE DAILY AS NEEDED     • baclofen (LIORESAL) 10 MG tablet baclofen 10 mg tablet   TAKE 1 TABLET BY MOUTH 4 TIMES DAILY AS NEEDED     • BD Pen Needle Gavi 2nd Gen 32G X 4 MM misc USE 5 TO 7 TIMES A DAY     • benazepril (LOTENSIN) 10 MG tablet benazepril 10 mg tablet   TAKE 1 TABLET BY MOUTH ONCE DAILY     • benazepril (LOTENSIN) 5 MG tablet      • calcipotriene (DOVONOX) 0.005 % ointment calcipotriene 0.005 % topical ointment     • carbidopa-levodopa (SINEMET)  MG per tablet carbidopa 25 mg-levodopa 100 mg tablet   TAKE 1 TABLET BY MOUTH EVERY DAY AT BEDTIME     • cetirizine (zyrTEC) 10 MG tablet cetirizine 10 mg tablet   TAKE 1 TABLET BY MOUTH ONCE DAILY     • cholecalciferol (VITAMIN D3) 1.25 MG (75132 UT) capsule      • citalopram (CeleXA) 20 MG tablet Take 20 mg by mouth Daily.     • clobetasol (TEMOVATE) 0.05 % external solution      • Continuous Blood Gluc Sensor (Dexcom G6 Sensor)      • Continuous Blood Gluc Transmit (Dexcom G6 Transmitter) misc See Admin Instructions.     • Cyanocobalamin 1000 MCG sublingual tablet cyanocobalamin (vit B-12) 1,000 mcg sublingual  tablet   Place 1 tablet every day by sublingual route.     • cyclobenzaprine (FLEXERIL) 10 MG tablet 10 mg.     • Diclofenac Sodium (VOLTAREN) 1 % gel gel diclofenac 1 % topical gel   APPLY 2 GRAMS TOPICALLY TO THE AFFECTED AREA(S) FOUR TIMES DAILY     • EPINEPHrine (EPIPEN) 0.3 MG/0.3ML solution auto-injector injection epinephrine 0.3 mg/0.3 mL injection, auto-injector   USE AS DIRECTED FOR ACUTE ALLERGIC REACTION     • famotidine (PEPCID) 40 MG tablet famotidine 40 mg tablet   TAKE 1 TABLET BY MOUTH TWICE DAILY     • fluocinonide (LIDEX) 0.05 % cream Apply  topically to the appropriate area as directed Daily.     • fluticasone (FLONASE) 50 MCG/ACT nasal spray fluticasone propionate 50 mcg/actuation nasal spray,suspension   USE 2 SPRAY(S) IN EACH NOSTRIL ONCE DAILY     • folic acid (FOLVITE) 1 MG tablet folic acid 1 mg tablet     • gabapentin (NEURONTIN) 300 MG capsule      • glucose blood test strip Accu-Chek Mary Plus test strips     • hepatitis A (Havrix) 1440 EL U/ML vaccine Havrix (PF) 1,440 DIXON unit/mL intramuscular syringe     • hydrocortisone 2.5 % lotion hydrocortisone 2.5 % lotion   APPLY A THIN LAYER TO THE AFFECTED AREA(S) BY TOPICAL ROUTE ONCE DAILY     • influenza vac split quad (FLUZONE,FLUARIX,AFLURIA,FLULAVAL) 0.5 ML suspension prefilled syringe injection Fluarix Quad 8477-3676 (PF) 60 mcg (15 mcg x 4)/0.5 mL IM syringe     • insulin aspart (NovoLOG FlexPen) 100 UNIT/ML solution pen-injector sc pen Novolog Flexpen U-100 Insulin aspart 100 unit/mL (3 mL) subcutaneous   INJECT 12 UNITS SUBCUTANEOUSLY WITH BREAKFAST, THEN INJECT 10 UNITS WITH LUNCH, THEN INJECT 20 UNITS WITH SUPPER     • Insulin Degludec (Tresiba FlexTouch) 200 UNIT/ML solution pen-injector pen injection Tresiba FlexTouch U-200 insulin 200 unit/mL (3 mL) subcutaneous pen   INJECT 42 UNITS SUBCUTANEOUSLY IN THE MORNING AND 42 IN THE EVENING FOR DIABETES     • Insulin Lispro (HumaLOG KwikPen) 200 UNIT/ML solution pen-injector Humalog  KwikPen U-200 Insulin 200 unit/mL (3 mL) subcutaneous     • ketoconazole (NIZORAL) 2 % shampoo ketoconazole 2 % shampoo   APPLY TOPICALLY TO SCALP 2 3 TIMES PER WEEK; APPLY TO DRY SCALP LET IT SIT FOR 15 20 MINUTES THEN RINSE     • lactulose (CHRONULAC) 10 GM/15ML solution Take 15-30 mL by mouth 2 (Two) Times a Day. Goal of 2-3 bowel movements per day. 900 mL 5   • lidocaine (XYLOCAINE) 5 % ointment lidocaine 5 % topical ointment     • magnesium citrate 1.745 GM/30ML solution solution      • magnesium oxide (MAG-OX) 400 MG tablet 400 mg.     • meloxicam (MOBIC) 15 MG tablet meloxicam 15 mg tablet   TAKE 1 TABLET BY MOUTH ONCE DAILY     • montelukast (SINGULAIR) 10 MG tablet montelukast 10 mg tablet     • mupirocin (BACTROBAN) 2 % ointment mupirocin 2 % topical ointment     • nitroglycerin (NITROSTAT) 0.4 MG SL tablet nitroglycerin 0.4 mg sublingual tablet     • pramipexole (MIRAPEX) 0.125 MG tablet pramipexole 0.125 mg tablet   TAKE 1 TABLET BY MOUTH TWICE DAILY     • rivastigmine (EXELON) 4.5 MG capsule rivastigmine 4.5 mg capsule     • sildenafil (VIAGRA) 25 MG tablet sildenafil 25 mg tablet   Take 1 tablet every day by oral route.     • SITagliptin (JANUVIA) 100 MG tablet Januvia 100 mg tablet   TAKE 1 TABLET BY MOUTH ONCE DAILY FOR DIABETES     • Spiriva Respimat 1.25 MCG/ACT aerosol solution inhaler INHALE 2 SPRAY(S) BY MOUTH ONCE DAILY     • Tdap (Adacel) 5-2-15.5 LF-MCG/0.5 injection Adacel (Tdap Adolesn/Adult)(PF)2 Lf-(2.5-5-3-5)-5 Lf/0.5 mL IM syringe     • triamcinolone (KENALOG) 0.1 % cream triamcinolone acetonide 0.1 % topical cream   APPLY CREAM EXTERNALLY TO AFFECTED AREAS TWICE DAILY FOR NO MORE THAN 2 WEEKS PER MONTH     • ubrogepant (UBRELVY) 100 MG tablet Take 100 mg by mouth.     • [DISCONTINUED] Cetirizine HCl 10 MG capsule Take 1 tablet by mouth Daily.     • [DISCONTINUED] RABEprazole (ACIPHEX) 20 MG EC tablet Take 1 tablet by mouth Daily. 90 tablet 0   • [DISCONTINUED] amoxicillin (AMOXIL) 500  "MG capsule      • [DISCONTINUED] carbidopa-levodopa ER (SINEMET CR)  MG per tablet carbidopa ER 25 mg-levodopa 100 mg tablet,extended release   TAKE 1 TABLET BY MOUTH EVERY DAY AT BEDTIME     • [DISCONTINUED] Cholecalciferol 50 MCG (2000 UT) tablet Take 1 tablet by mouth Daily.     • [DISCONTINUED] Cyanocobalamin 1000 MCG sublingual tablet at bed time.     • [DISCONTINUED] gabapentin (NEURONTIN) 400 MG capsule gabapentin 400 mg capsule   TAKE 1 CAPSULE BY MOUTH THREE TIMES DAILY     • [DISCONTINUED] HYDROcodone-acetaminophen (NORCO)  MG per tablet hydrocodone 10 mg-acetaminophen 325 mg tablet       No current facility-administered medications on file prior to visit.       Social History     Social History Narrative   • Not on file         Objective     Review of Systems   Constitutional: Positive for unexpected weight gain.   Gastrointestinal: Positive for abdominal distention, abdominal pain, constipation, nausea and GERD.   Neurological: Positive for memory problem.        Vital Signs:   /77 (BP Location: Right arm, Patient Position: Sitting, Cuff Size: Large Adult)   Pulse 88   Ht 182.9 cm (72\")   Wt 135 kg (298 lb 3.2 oz)   BMI 40.44 kg/m²       Physical Exam  Constitutional:       General: He is not in acute distress.     Appearance: Normal appearance. He is well-developed and normal weight.   HENT:      Head: Normocephalic and atraumatic.   Eyes:      Conjunctiva/sclera: Conjunctivae normal.      Pupils: Pupils are equal, round, and reactive to light.      Visual Fields: Right eye visual fields normal and left eye visual fields normal.   Cardiovascular:      Rate and Rhythm: Normal rate and regular rhythm.      Heart sounds: Normal heart sounds.   Pulmonary:      Effort: Pulmonary effort is normal. No retractions.      Breath sounds: Normal breath sounds and air entry.   Abdominal:      General: Bowel sounds are normal. There is distension.      Palpations: Abdomen is soft.      " Tenderness: There is abdominal tenderness in the left upper quadrant.   Musculoskeletal:         General: Normal range of motion.      Cervical back: Normal range of motion and neck supple.   Skin:     General: Skin is warm and dry.   Neurological:      Mental Status: He is alert and oriented to person, place, and time.   Psychiatric:         Mood and Affect: Mood and affect normal.         Behavior: Behavior normal.         Result Review :   The following data was reviewed by: JALEN Link on 07/12/2022:  CBC w/diff    CBC w/Diff 1/31/22   WBC 6.71   RBC 4.73   Hemoglobin 13.7   Hematocrit 42.4   MCV 89.6   MCH 29.0   MCHC 32.3   RDW 13.1   Platelets 231   Neutrophil Rel % 58.7   Immature Granulocyte Rel % 0.1   Lymphocyte Rel % 32.3   Monocyte Rel % 7.3   Eosinophil Rel % 1.2   Basophil Rel % 0.4           CMP    CMP 1/31/22   Glucose 100 (A)   BUN 11   Creatinine 0.91   eGFR Non African Am 86   Sodium 141   Potassium 4.4   Chloride 106   Calcium 9.2   Albumin 4.00   Total Bilirubin 0.5   Alkaline Phosphatase 53   AST (SGOT) 21   ALT (SGPT) 20   (A) Abnormal value            Protime   Date Value Ref Range Status   01/31/2022 10.1 9.4 - 12.0 Seconds Final     INR   Date Value Ref Range Status   01/31/2022 0.96 (L) 2.00 - 3.00 Final     Ammonia   Date Value Ref Range Status   01/31/2022 27 16 - 60 umol/L Final        CT of abdomen pelvis with contrast 6/7/2022: Cirrhosis.     Assessment and Plan    Diagnoses and all orders for this visit:    1. Non-alcoholic cirrhosis (HCC) (Primary)  -     Case Request; Standing  -     Case Request  -     US Paracentesis; Future    2. Change in stool  -     Case Request; Standing  -     Case Request    3. Left upper quadrant abdominal pain  -     Case Request; Standing  -     Case Request  -     Lipase; Future    4. Heartburn  -     Case Request; Standing  -     Case Request    5. Nausea  -     Case Request; Standing  -     Case Request    6. Other ascites  -     US  Paracentesis; Future  -     Protime-INR  -     Platelet Count    Other orders  -     Follow Anesthesia Guidelines / Protocol; Future  -     Obtain Informed Consent; Future  -     polyethylene glycol-electrolytes (Nulytely with Flavor Packs) 420 g solution; Take 4,000 mL by mouth 1 (One) Time for 1 dose.  Dispense: 4000 mL; Refill: 0  -     dexlansoprazole (DEXILANT) 60 MG capsule; Take 1 capsule by mouth Daily.  Dispense: 30 capsule; Refill: 5      ESOPHAGOGASTRODUODENOSCOPY (N/A), COLONOSCOPY (N/A)       Follow Up   Return for Follow up after procedure.  Patient was given instructions and counseling regarding his condition or for health maintenance advice. Please see specific information pulled into the AVS if appropriate.

## 2022-07-13 ENCOUNTER — LAB (OUTPATIENT)
Dept: LAB | Facility: HOSPITAL | Age: 57
End: 2022-07-13

## 2022-07-13 ENCOUNTER — HOSPITAL ENCOUNTER (OUTPATIENT)
Dept: ULTRASOUND IMAGING | Facility: HOSPITAL | Age: 57
Discharge: HOME | End: 2022-07-13

## 2022-07-13 ENCOUNTER — HOSPITAL ENCOUNTER (OUTPATIENT)
Dept: INTERVENTIONAL RADIOLOGY/VASCULAR | Facility: HOSPITAL | Age: 57
Discharge: HOME | End: 2022-07-13

## 2022-07-13 ENCOUNTER — TELEPHONE (OUTPATIENT)
Dept: GASTROENTEROLOGY | Facility: CLINIC | Age: 57
End: 2022-07-13

## 2022-07-13 DIAGNOSIS — K74.60 NON-ALCOHOLIC CIRRHOSIS: ICD-10-CM

## 2022-07-13 DIAGNOSIS — R18.8 OTHER ASCITES: ICD-10-CM

## 2022-07-13 LAB
INR PPP: 0.95 (ref 0.86–1.15)
PLATELET # BLD AUTO: 216 10*3/MM3 (ref 140–450)
PROTHROMBIN TIME: 12.8 SECONDS (ref 11.8–14.9)

## 2022-07-13 PROCEDURE — 85610 PROTHROMBIN TIME: CPT | Performed by: NURSE PRACTITIONER

## 2022-07-13 PROCEDURE — 85049 AUTOMATED PLATELET COUNT: CPT | Performed by: NURSE PRACTITIONER

## 2022-07-13 PROCEDURE — 85025 COMPLETE CBC W/AUTO DIFF WBC: CPT | Performed by: PAIN MEDICINE

## 2022-07-13 PROCEDURE — 76705 ECHO EXAM OF ABDOMEN: CPT

## 2022-07-13 RX ORDER — LIDOCAINE HYDROCHLORIDE 20 MG/ML
20 INJECTION, SOLUTION INFILTRATION; PERINEURAL ONCE
Status: DISCONTINUED | OUTPATIENT
Start: 2022-07-13 | End: 2022-07-14 | Stop reason: HOSPADM

## 2022-07-13 NOTE — TELEPHONE ENCOUNTER
----- Message from JALEN Link sent at 7/13/2022  9:20 AM EDT -----  Lipase is mildly elevated.  At times lipase is elevated with pancreatitis however patient does have recent imaging within the last month that shows normal pancreas.  I do not really want to expose you to any more radiation at this time.  If patient would like I can go on and order a CT however I feel we should wait and see the results of the paracentesis today.

## 2022-07-14 ENCOUNTER — LAB (OUTPATIENT)
Dept: LAB | Facility: HOSPITAL | Age: 57
End: 2022-07-14

## 2022-07-14 ENCOUNTER — TRANSCRIBE ORDERS (OUTPATIENT)
Dept: ADMINISTRATIVE | Facility: HOSPITAL | Age: 57
End: 2022-07-14

## 2022-07-14 DIAGNOSIS — E83.119 HEMOCHROMATOSIS, UNSPECIFIED HEMOCHROMATOSIS TYPE: Primary | ICD-10-CM

## 2022-07-14 DIAGNOSIS — E83.01 WILSON DISEASE: ICD-10-CM

## 2022-07-14 LAB
ALBUMIN SERPL-MCNC: 4.3 G/DL (ref 3.5–5.2)
ALBUMIN/GLOB SERPL: 1.7 G/DL
ALP SERPL-CCNC: 55 U/L (ref 39–117)
ALT SERPL W P-5'-P-CCNC: 18 U/L (ref 1–41)
ANION GAP SERPL CALCULATED.3IONS-SCNC: 13.4 MMOL/L (ref 5–15)
AST SERPL-CCNC: 22 U/L (ref 1–40)
BASOPHILS # BLD AUTO: 0.05 10*3/MM3 (ref 0–0.2)
BASOPHILS NFR BLD AUTO: 0.7 % (ref 0–1.5)
BILIRUB SERPL-MCNC: <0.2 MG/DL (ref 0–1.2)
BUN SERPL-MCNC: 15 MG/DL (ref 6–20)
BUN/CREAT SERPL: 14.7 (ref 7–25)
CALCIUM SPEC-SCNC: 9.3 MG/DL (ref 8.6–10.5)
CERULOPLASMIN SERPL-MCNC: 25 MG/DL (ref 16–31)
CHLORIDE SERPL-SCNC: 103 MMOL/L (ref 98–107)
CO2 SERPL-SCNC: 22.6 MMOL/L (ref 22–29)
CREAT SERPL-MCNC: 1.02 MG/DL (ref 0.76–1.27)
DEPRECATED RDW RBC AUTO: 43 FL (ref 37–54)
EGFRCR SERPLBLD CKD-EPI 2021: 85.2 ML/MIN/1.73
EOSINOPHIL # BLD AUTO: 0.15 10*3/MM3 (ref 0–0.4)
EOSINOPHIL NFR BLD AUTO: 2.1 % (ref 0.3–6.2)
ERYTHROCYTE [DISTWIDTH] IN BLOOD BY AUTOMATED COUNT: 13.2 % (ref 12.3–15.4)
FERRITIN SERPL-MCNC: 36.1 NG/ML (ref 30–400)
GLOBULIN UR ELPH-MCNC: 2.6 GM/DL
GLUCOSE SERPL-MCNC: 122 MG/DL (ref 65–99)
HCT VFR BLD AUTO: 42.7 % (ref 37.5–51)
HGB BLD-MCNC: 14 G/DL (ref 13–17.7)
IMM GRANULOCYTES # BLD AUTO: 0.03 10*3/MM3 (ref 0–0.05)
IMM GRANULOCYTES NFR BLD AUTO: 0.4 % (ref 0–0.5)
IRON 24H UR-MRATE: 63 MCG/DL (ref 59–158)
LYMPHOCYTES # BLD AUTO: 2.38 10*3/MM3 (ref 0.7–3.1)
LYMPHOCYTES NFR BLD AUTO: 33.8 % (ref 19.6–45.3)
MCH RBC QN AUTO: 29.2 PG (ref 26.6–33)
MCHC RBC AUTO-ENTMCNC: 32.8 G/DL (ref 31.5–35.7)
MCV RBC AUTO: 89 FL (ref 79–97)
MONOCYTES # BLD AUTO: 0.41 10*3/MM3 (ref 0.1–0.9)
MONOCYTES NFR BLD AUTO: 5.8 % (ref 5–12)
NEUTROPHILS NFR BLD AUTO: 4.03 10*3/MM3 (ref 1.7–7)
NEUTROPHILS NFR BLD AUTO: 57.2 % (ref 42.7–76)
NRBC BLD AUTO-RTO: 0 /100 WBC (ref 0–0.2)
PLATELET # BLD AUTO: 250 10*3/MM3 (ref 140–450)
PMV BLD AUTO: 9.9 FL (ref 6–12)
POTASSIUM SERPL-SCNC: 4.8 MMOL/L (ref 3.5–5.2)
PROT SERPL-MCNC: 6.9 G/DL (ref 6–8.5)
RBC # BLD AUTO: 4.8 10*6/MM3 (ref 4.14–5.8)
SODIUM SERPL-SCNC: 139 MMOL/L (ref 136–145)
TRANSFERRIN SERPL-MCNC: 301 MG/DL (ref 200–360)
WBC NRBC COR # BLD: 7.05 10*3/MM3 (ref 3.4–10.8)

## 2022-07-15 NOTE — TELEPHONE ENCOUNTER
Per verbal release, patient's spouse was given results and recommendations.     Paracentesis could not be preformed due to no evidence on fluid found on an US.

## 2022-08-01 ENCOUNTER — LAB (OUTPATIENT)
Dept: LAB | Facility: HOSPITAL | Age: 57
End: 2022-08-01

## 2022-08-01 ENCOUNTER — HOSPITAL ENCOUNTER (OUTPATIENT)
Dept: ULTRASOUND IMAGING | Facility: HOSPITAL | Age: 57
Discharge: HOME | End: 2022-08-01

## 2022-08-01 DIAGNOSIS — K74.69 OTHER CIRRHOSIS OF LIVER: ICD-10-CM

## 2022-08-01 LAB
ALBUMIN SERPL-MCNC: 3.5 G/DL (ref 3.5–5.2)
ALBUMIN/GLOB SERPL: 1.2 G/DL
ALP SERPL-CCNC: 44 U/L (ref 39–117)
ALT SERPL W P-5'-P-CCNC: 17 U/L (ref 1–41)
AMMONIA BLD-SCNC: 33 UMOL/L (ref 16–60)
ANION GAP SERPL CALCULATED.3IONS-SCNC: 8.5 MMOL/L (ref 5–15)
AST SERPL-CCNC: 17 U/L (ref 1–40)
BASOPHILS # BLD AUTO: 0.02 10*3/MM3 (ref 0–0.2)
BASOPHILS NFR BLD AUTO: 0.3 % (ref 0–1.5)
BILIRUB SERPL-MCNC: 0.2 MG/DL (ref 0–1.2)
BUN SERPL-MCNC: 14 MG/DL (ref 6–20)
BUN/CREAT SERPL: 11.5 (ref 7–25)
CALCIUM SPEC-SCNC: 8.9 MG/DL (ref 8.6–10.5)
CHLORIDE SERPL-SCNC: 105 MMOL/L (ref 98–107)
CO2 SERPL-SCNC: 25.5 MMOL/L (ref 22–29)
CREAT SERPL-MCNC: 1.22 MG/DL (ref 0.76–1.27)
DEPRECATED RDW RBC AUTO: 43.2 FL (ref 37–54)
EGFRCR SERPLBLD CKD-EPI 2021: 68.3 ML/MIN/1.73
EOSINOPHIL # BLD AUTO: 0.18 10*3/MM3 (ref 0–0.4)
EOSINOPHIL NFR BLD AUTO: 3 % (ref 0.3–6.2)
ERYTHROCYTE [DISTWIDTH] IN BLOOD BY AUTOMATED COUNT: 13 % (ref 12.3–15.4)
GLOBULIN UR ELPH-MCNC: 2.9 GM/DL
GLUCOSE SERPL-MCNC: 167 MG/DL (ref 65–99)
HCT VFR BLD AUTO: 38.7 % (ref 37.5–51)
HGB BLD-MCNC: 12.5 G/DL (ref 13–17.7)
IMM GRANULOCYTES # BLD AUTO: 0.02 10*3/MM3 (ref 0–0.05)
IMM GRANULOCYTES NFR BLD AUTO: 0.3 % (ref 0–0.5)
INR PPP: 0.99 (ref 0.86–1.15)
LYMPHOCYTES # BLD AUTO: 2.7 10*3/MM3 (ref 0.7–3.1)
LYMPHOCYTES NFR BLD AUTO: 44.6 % (ref 19.6–45.3)
MCH RBC QN AUTO: 28.8 PG (ref 26.6–33)
MCHC RBC AUTO-ENTMCNC: 32.3 G/DL (ref 31.5–35.7)
MCV RBC AUTO: 89.2 FL (ref 79–97)
MONOCYTES # BLD AUTO: 0.48 10*3/MM3 (ref 0.1–0.9)
MONOCYTES NFR BLD AUTO: 7.9 % (ref 5–12)
NEUTROPHILS NFR BLD AUTO: 2.65 10*3/MM3 (ref 1.7–7)
NEUTROPHILS NFR BLD AUTO: 43.9 % (ref 42.7–76)
PLATELET # BLD AUTO: 197 10*3/MM3 (ref 140–450)
PMV BLD AUTO: 9.8 FL (ref 6–12)
POTASSIUM SERPL-SCNC: 4.3 MMOL/L (ref 3.5–5.2)
PROT SERPL-MCNC: 6.4 G/DL (ref 6–8.5)
PROTHROMBIN TIME: 13.2 SECONDS (ref 11.8–14.9)
RBC # BLD AUTO: 4.34 10*6/MM3 (ref 4.14–5.8)
SODIUM SERPL-SCNC: 139 MMOL/L (ref 136–145)
WBC NRBC COR # BLD: 6.05 10*3/MM3 (ref 3.4–10.8)

## 2022-08-01 PROCEDURE — 76705 ECHO EXAM OF ABDOMEN: CPT

## 2022-08-01 PROCEDURE — 85610 PROTHROMBIN TIME: CPT

## 2022-08-01 PROCEDURE — 36415 COLL VENOUS BLD VENIPUNCTURE: CPT

## 2022-08-01 PROCEDURE — 85025 COMPLETE CBC W/AUTO DIFF WBC: CPT

## 2022-08-01 PROCEDURE — 80053 COMPREHEN METABOLIC PANEL: CPT

## 2022-08-01 PROCEDURE — 82140 ASSAY OF AMMONIA: CPT

## 2022-08-04 ENCOUNTER — TELEPHONE (OUTPATIENT)
Dept: GASTROENTEROLOGY | Facility: CLINIC | Age: 57
End: 2022-08-04

## 2022-08-04 NOTE — TELEPHONE ENCOUNTER
----- Message from JALEN Lerma sent at 8/1/2022 12:46 PM EDT -----  US c/w known cirrhosis.  No signs of hepatocellular carcinoma.  Recommend repeat in 6 months for surveillance.

## 2022-08-23 RX ORDER — RABEPRAZOLE SODIUM 20 MG/1
20 TABLET, DELAYED RELEASE ORAL DAILY
Qty: 90 TABLET | Refills: 3 | Status: SHIPPED | OUTPATIENT
Start: 2022-08-23 | End: 2022-11-23

## 2022-10-06 RX ORDER — LACTULOSE 10 G/15ML
15-30 SOLUTION ORAL 2 TIMES DAILY
Qty: 900 ML | Refills: 5 | Status: SHIPPED | OUTPATIENT
Start: 2022-10-06

## 2022-10-10 ENCOUNTER — OFFICE VISIT (OUTPATIENT)
Dept: GASTROENTEROLOGY | Facility: CLINIC | Age: 57
End: 2022-10-10

## 2022-10-10 ENCOUNTER — LAB (OUTPATIENT)
Dept: LAB | Facility: HOSPITAL | Age: 57
End: 2022-10-10

## 2022-10-10 VITALS
OXYGEN SATURATION: 100 % | HEIGHT: 72 IN | WEIGHT: 293.4 LBS | BODY MASS INDEX: 39.74 KG/M2 | HEART RATE: 86 BPM | TEMPERATURE: 97.9 F

## 2022-10-10 DIAGNOSIS — D64.9 ANEMIA, UNSPECIFIED TYPE: ICD-10-CM

## 2022-10-10 DIAGNOSIS — K74.69 OTHER CIRRHOSIS OF LIVER: Primary | ICD-10-CM

## 2022-10-10 DIAGNOSIS — K74.69 OTHER CIRRHOSIS OF LIVER: ICD-10-CM

## 2022-10-10 DIAGNOSIS — R10.12 LEFT UPPER QUADRANT ABDOMINAL PAIN: ICD-10-CM

## 2022-10-10 LAB
ALBUMIN SERPL-MCNC: 4 G/DL (ref 3.5–5.2)
ALBUMIN/GLOB SERPL: 1.7 G/DL
ALP SERPL-CCNC: 53 U/L (ref 39–117)
ALT SERPL W P-5'-P-CCNC: 14 U/L (ref 1–41)
ANION GAP SERPL CALCULATED.3IONS-SCNC: 6 MMOL/L (ref 5–15)
AST SERPL-CCNC: 17 U/L (ref 1–40)
BASOPHILS # BLD AUTO: 0.03 10*3/MM3 (ref 0–0.2)
BASOPHILS NFR BLD AUTO: 0.5 % (ref 0–1.5)
BILIRUB SERPL-MCNC: 0.3 MG/DL (ref 0–1.2)
BUN SERPL-MCNC: 14 MG/DL (ref 6–20)
BUN/CREAT SERPL: 14.1 (ref 7–25)
CALCIUM SPEC-SCNC: 9.2 MG/DL (ref 8.6–10.5)
CHLORIDE SERPL-SCNC: 106 MMOL/L (ref 98–107)
CO2 SERPL-SCNC: 26 MMOL/L (ref 22–29)
CREAT SERPL-MCNC: 0.99 MG/DL (ref 0.76–1.27)
DEPRECATED RDW RBC AUTO: 43.2 FL (ref 37–54)
EGFRCR SERPLBLD CKD-EPI 2021: 87.8 ML/MIN/1.73
EOSINOPHIL # BLD AUTO: 0.1 10*3/MM3 (ref 0–0.4)
EOSINOPHIL NFR BLD AUTO: 1.7 % (ref 0.3–6.2)
ERYTHROCYTE [DISTWIDTH] IN BLOOD BY AUTOMATED COUNT: 13.3 % (ref 12.3–15.4)
GLOBULIN UR ELPH-MCNC: 2.4 GM/DL
GLUCOSE SERPL-MCNC: 179 MG/DL (ref 65–99)
HCT VFR BLD AUTO: 42.5 % (ref 37.5–51)
HGB BLD-MCNC: 13.5 G/DL (ref 13–17.7)
IMM GRANULOCYTES # BLD AUTO: 0 10*3/MM3 (ref 0–0.05)
IMM GRANULOCYTES NFR BLD AUTO: 0 % (ref 0–0.5)
INR PPP: 0.95 (ref 0.86–1.15)
IRON 24H UR-MRATE: 44 MCG/DL (ref 59–158)
IRON SATN MFR SERPL: 10 % (ref 20–50)
LYMPHOCYTES # BLD AUTO: 1.73 10*3/MM3 (ref 0.7–3.1)
LYMPHOCYTES NFR BLD AUTO: 29.6 % (ref 19.6–45.3)
MCH RBC QN AUTO: 27.9 PG (ref 26.6–33)
MCHC RBC AUTO-ENTMCNC: 31.8 G/DL (ref 31.5–35.7)
MCV RBC AUTO: 87.8 FL (ref 79–97)
MONOCYTES # BLD AUTO: 0.36 10*3/MM3 (ref 0.1–0.9)
MONOCYTES NFR BLD AUTO: 6.2 % (ref 5–12)
NEUTROPHILS NFR BLD AUTO: 3.62 10*3/MM3 (ref 1.7–7)
NEUTROPHILS NFR BLD AUTO: 62 % (ref 42.7–76)
PLATELET # BLD AUTO: 222 10*3/MM3 (ref 140–450)
PMV BLD AUTO: 9.7 FL (ref 6–12)
POTASSIUM SERPL-SCNC: 4.5 MMOL/L (ref 3.5–5.2)
PROT SERPL-MCNC: 6.4 G/DL (ref 6–8.5)
PROTHROMBIN TIME: 12.8 SECONDS (ref 11.8–14.9)
RBC # BLD AUTO: 4.84 10*6/MM3 (ref 4.14–5.8)
SODIUM SERPL-SCNC: 138 MMOL/L (ref 136–145)
TIBC SERPL-MCNC: 431 MCG/DL (ref 298–536)
TRANSFERRIN SERPL-MCNC: 289 MG/DL (ref 200–360)
WBC NRBC COR # BLD: 5.84 10*3/MM3 (ref 3.4–10.8)

## 2022-10-10 PROCEDURE — 36415 COLL VENOUS BLD VENIPUNCTURE: CPT | Performed by: NURSE PRACTITIONER

## 2022-10-10 PROCEDURE — 80053 COMPREHEN METABOLIC PANEL: CPT

## 2022-10-10 PROCEDURE — 85610 PROTHROMBIN TIME: CPT | Performed by: NURSE PRACTITIONER

## 2022-10-10 PROCEDURE — 83540 ASSAY OF IRON: CPT

## 2022-10-10 PROCEDURE — 85025 COMPLETE CBC W/AUTO DIFF WBC: CPT | Performed by: NURSE PRACTITIONER

## 2022-10-10 PROCEDURE — 84466 ASSAY OF TRANSFERRIN: CPT

## 2022-10-10 PROCEDURE — 99214 OFFICE O/P EST MOD 30 MIN: CPT | Performed by: NURSE PRACTITIONER

## 2022-10-10 RX ORDER — OFLOXACIN 3 MG/ML
SOLUTION/ DROPS OPHTHALMIC
COMMUNITY
Start: 2022-09-28 | End: 2022-11-23

## 2022-10-10 RX ORDER — PEN NEEDLE, DIABETIC 32GX 5/32"
NEEDLE, DISPOSABLE MISCELLANEOUS
COMMUNITY

## 2022-10-10 RX ORDER — DEXLANSOPRAZOLE 60 MG/1
CAPSULE, DELAYED RELEASE ORAL
COMMUNITY
End: 2022-12-19

## 2022-10-10 NOTE — PROGRESS NOTES
"  Chief Complaint  Cirrhosis (FOLLOW UP, CIRRHOSIS)    History of Present Illness  Edgardo Grayson is a 57 y.o. who presents to [facility] for follow up of Cirrhosis (FOLLOW UP, CIRRHOSIS).    Mr. Grayson reports he does feel that the LUQ pain has decreased since decreasing NSAID usage. Is now back to work. Noticing an increase in joint pain lately and does have an appointment with rheumatology. Nausea has also decreased, \"but I still have my days\". Heartburn controlled with PPI.  Denies any dysphagia, change in appetite, or unexplained weight loss.    PCP placed him on a \" 3 day antibiotic\" and that did decrease the diarrhea. Bowel movement now 3-4x/day. Continues to take lactulose 2-3x/day. Continues to have occasional confusion but wife reports she does not notice it. Denies any hematochezia or melena.     Scheduled for scopes upcoming in November.    Labs Result Review Imaging    Past Medical History:   Diagnosis Date   • Anxiety    • Chronic headaches    • Cirrhosis (HCC)    • Depression    • Diabetes mellitus (HCC)    • Hyperlipidemia    • Hypertension    • Liver disease     Cirrhosis    • Muscle pain    • Parkinson disease (HCC)    • Viral hepatitis A without hepatic coma        Past Surgical History:   Procedure Laterality Date   • ANAL FISSURECTOMY     • CHOLECYSTECTOMY     • COLONOSCOPY     • HIP ARTHROPLASTY      X2 Left    • LIVER BIOPSY     • POSTERIOR LUMBAR/THORACIC SPINE FUSION     • ROTATOR CUFF REPAIR     • TONSILLECTOMY     • UPPER GASTROINTESTINAL ENDOSCOPY         Current Outpatient Medications on File Prior to Visit   Medication Sig Dispense Refill   • albuterol sulfate  (90 Base) MCG/ACT inhaler albuterol sulfate HFA 90 mcg/actuation aerosol inhaler   INHALE 2 PUFFS BY MOUTH EVERY 4 TO 6 HOURS AS NEEDED FOR COUGH WHEEZE OR SHORTNESS OF BREATH USE WITH VORTEX SPACER     • Apremilast (Otezla) 30 MG tablet Otezla 30 mg tablet   TAKE 2 TABLETS BY MOUTH ONCE DAILY "     • aspirin 81 MG EC tablet Aspir-81 mg tablet,delayed release   Take 1 tablet every day by oral route.     • azelastine (ASTELIN) 0.1 % nasal spray azelastine 137 mcg (0.1 %) nasal spray aerosol   USE 1 TO 2 SPRAY(S) IN EACH NOSTRIL TWICE DAILY AS NEEDED     • baclofen (LIORESAL) 10 MG tablet baclofen 10 mg tablet   TAKE 1 TABLET BY MOUTH 4 TIMES DAILY AS NEEDED     • BD Pen Needle Gavi 2nd Gen 32G X 4 MM misc USE 5 TO 7 TIMES A DAY     • benazepril (LOTENSIN) 10 MG tablet benazepril 10 mg tablet   TAKE 1 TABLET BY MOUTH ONCE DAILY     • benazepril (LOTENSIN) 5 MG tablet      • calcipotriene (DOVONOX) 0.005 % ointment calcipotriene 0.005 % topical ointment     • carbidopa-levodopa (SINEMET)  MG per tablet carbidopa 25 mg-levodopa 100 mg tablet   TAKE 1 TABLET BY MOUTH EVERY DAY AT BEDTIME     • cetirizine (zyrTEC) 10 MG tablet cetirizine 10 mg tablet   TAKE 1 TABLET BY MOUTH ONCE DAILY     • cholecalciferol (VITAMIN D3) 1.25 MG (23457 UT) capsule      • citalopram (CeleXA) 20 MG tablet Take 20 mg by mouth Daily.     • clobetasol (TEMOVATE) 0.05 % external solution      • Continuous Blood Gluc Sensor (Dexcom G6 Sensor)      • Continuous Blood Gluc Transmit (Dexcom G6 Transmitter) misc See Admin Instructions.     • Cyanocobalamin 1000 MCG sublingual tablet cyanocobalamin (vit B-12) 1,000 mcg sublingual tablet   Place 1 tablet every day by sublingual route.     • cyclobenzaprine (FLEXERIL) 10 MG tablet 10 mg.     • dexlansoprazole (DEXILANT) 60 MG capsule dexlansoprazole 60 mg capsule,biphase delayed release   1 capsule po q day     • Diclofenac Sodium (VOLTAREN) 1 % gel gel diclofenac 1 % topical gel   APPLY 2 GRAMS TOPICALLY TO THE AFFECTED AREA(S) FOUR TIMES DAILY     • EPINEPHrine (EPIPEN) 0.3 MG/0.3ML solution auto-injector injection epinephrine 0.3 mg/0.3 mL injection, auto-injector   USE AS DIRECTED FOR ACUTE ALLERGIC REACTION     • famotidine (PEPCID) 40 MG tablet famotidine 40 mg tablet   TAKE 1  "TABLET BY MOUTH TWICE DAILY     • fluocinonide (LIDEX) 0.05 % cream Apply  topically to the appropriate area as directed Daily.     • fluticasone (FLONASE) 50 MCG/ACT nasal spray fluticasone propionate 50 mcg/actuation nasal spray,suspension   USE 2 SPRAY(S) IN EACH NOSTRIL ONCE DAILY     • folic acid (FOLVITE) 1 MG tablet folic acid 1 mg tablet     • gabapentin (NEURONTIN) 300 MG capsule      • glucose blood test strip Accu-Chek Mary Plus test strips     • hepatitis A (Havrix) 1440 EL U/ML vaccine Havrix (PF) 1,440 DIXON unit/mL intramuscular syringe     • hydrocortisone 2.5 % lotion hydrocortisone 2.5 % lotion   APPLY A THIN LAYER TO THE AFFECTED AREA(S) BY TOPICAL ROUTE ONCE DAILY     • influenza vac split quad (FLUZONE,FLUARIX,AFLURIA,FLULAVAL) 0.5 ML suspension prefilled syringe injection Fluarix Quad 3935-3943 (PF) 60 mcg (15 mcg x 4)/0.5 mL IM syringe     • insulin aspart (NovoLOG FlexPen) 100 UNIT/ML solution pen-injector sc pen Novolog Flexpen U-100 Insulin aspart 100 unit/mL (3 mL) subcutaneous   INJECT 12 UNITS SUBCUTANEOUSLY WITH BREAKFAST, THEN INJECT 10 UNITS WITH LUNCH, THEN INJECT 20 UNITS WITH SUPPER     • Insulin Degludec (Tresiba FlexTouch) 200 UNIT/ML solution pen-injector pen injection Tresiba FlexTouch U-200 insulin 200 unit/mL (3 mL) subcutaneous pen   INJECT 42 UNITS SUBCUTANEOUSLY IN THE MORNING AND 42 IN THE EVENING FOR DIABETES     • Insulin Lispro (HumaLOG KwikPen) 200 UNIT/ML solution pen-injector Humalog KwikPen U-200 Insulin 200 unit/mL (3 mL) subcutaneous     • Insulin Pen Needle (BD Pen Needle Gavi 2nd Gen) 32G X 4 MM misc BD Gavi 2nd Gen Pen Needle 32 gauge x 5/32\"   USE 1 PEN NEEDLE 5-7 TIMES DAILY     • ketoconazole (NIZORAL) 2 % shampoo ketoconazole 2 % shampoo   APPLY TOPICALLY TO SCALP 2 3 TIMES PER WEEK; APPLY TO DRY SCALP LET IT SIT FOR 15 20 MINUTES THEN RINSE     • lactulose (CHRONULAC) 10 GM/15ML solution Take 15-30 mL by mouth 2 (Two) Times a Day. Goal of 2-3 bowel " movements per day. 900 mL 5   • lidocaine (XYLOCAINE) 5 % ointment lidocaine 5 % topical ointment     • magnesium citrate 1.745 GM/30ML solution solution      • magnesium oxide (MAG-OX) 400 MG tablet 400 mg.     • meloxicam (MOBIC) 15 MG tablet meloxicam 15 mg tablet   TAKE 1 TABLET BY MOUTH ONCE DAILY     • montelukast (SINGULAIR) 10 MG tablet montelukast 10 mg tablet     • mupirocin (BACTROBAN) 2 % ointment mupirocin 2 % topical ointment     • nitroglycerin (NITROSTAT) 0.4 MG SL tablet nitroglycerin 0.4 mg sublingual tablet     • ofloxacin (OCUFLOX) 0.3 % ophthalmic solution INSTILL ONE DROP INTO TREATED EYE FOUR TIMES DAILY 4 DAYS BEFORE AND 4 DAYS AFTER INJECTIONS     • pramipexole (MIRAPEX) 0.125 MG tablet pramipexole 0.125 mg tablet   TAKE 1 TABLET BY MOUTH TWICE DAILY     • RABEprazole (ACIPHEX) 20 MG EC tablet Take 1 tablet by mouth Daily. 90 tablet 3   • rivastigmine (EXELON) 4.5 MG capsule rivastigmine 4.5 mg capsule     • sildenafil (VIAGRA) 25 MG tablet sildenafil 25 mg tablet   Take 1 tablet every day by oral route.     • SITagliptin (JANUVIA) 100 MG tablet Januvia 100 mg tablet   TAKE 1 TABLET BY MOUTH ONCE DAILY FOR DIABETES     • Spiriva Respimat 1.25 MCG/ACT aerosol solution inhaler INHALE 2 SPRAY(S) BY MOUTH ONCE DAILY     • Tdap (Adacel) 5-2-15.5 LF-MCG/0.5 injection Adacel (Tdap Adolesn/Adult)(PF)2 Lf-(2.5-5-3-5)-5 Lf/0.5 mL IM syringe     • triamcinolone (KENALOG) 0.1 % cream triamcinolone acetonide 0.1 % topical cream   APPLY CREAM EXTERNALLY TO AFFECTED AREAS TWICE DAILY FOR NO MORE THAN 2 WEEKS PER MONTH     • ubrogepant (UBRELVY) 100 MG tablet Take 100 mg by mouth.       No current facility-administered medications on file prior to visit.       Social History     Social History Narrative   • Not on file         Objective     Review of Systems   Constitutional: Positive for fatigue.   Gastrointestinal: Positive for GERD.   Neurological: Positive for confusion.        Vital Signs:   Pulse 86   " Temp 97.9 °F (36.6 °C) (Infrared)   Ht 182.9 cm (72.01\")   Wt 133 kg (293 lb 6.4 oz)   SpO2 100%   BMI 39.78 kg/m²       Physical Exam  Constitutional:       General: He is not in acute distress.     Appearance: Normal appearance. He is well-developed. He is obese.   HENT:      Head: Normocephalic and atraumatic.   Eyes:      Conjunctiva/sclera: Conjunctivae normal.      Pupils: Pupils are equal, round, and reactive to light.      Visual Fields: Right eye visual fields normal and left eye visual fields normal.   Cardiovascular:      Rate and Rhythm: Normal rate and regular rhythm.      Heart sounds: Normal heart sounds.   Pulmonary:      Effort: Pulmonary effort is normal. No retractions.      Breath sounds: Normal breath sounds and air entry.   Abdominal:      General: Bowel sounds are normal. There is no distension.      Palpations: Abdomen is soft.      Tenderness: There is no abdominal tenderness.      Comments: No appreciable hepatosplenomegaly or ascites   Musculoskeletal:         General: Normal range of motion.      Cervical back: Normal range of motion and neck supple.   Skin:     General: Skin is warm and dry.   Neurological:      Mental Status: He is alert and oriented to person, place, and time.   Psychiatric:         Mood and Affect: Mood and affect normal.         Behavior: Behavior normal.         Result Review :   The following data was reviewed by: JALEN Link on 10/10/2022:  CBC w/diff    CBC w/Diff 1/31/22 7/13/22 7/13/22 8/1/22     1309 1309    WBC 6.71  7.05 6.05   RBC 4.73  4.80 4.34   Hemoglobin 13.7  14.0 12.5 (A)   Hematocrit 42.4  42.7 38.7   MCV 89.6  89.0 89.2   MCH 29.0  29.2 28.8   MCHC 32.3  32.8 32.3   RDW 13.1  13.2 13.0   Platelets 231 216 250 197   Neutrophil Rel % 58.7  57.2 43.9   Immature Granulocyte Rel % 0.1  0.4 0.3   Lymphocyte Rel % 32.3  33.8 44.6   Monocyte Rel % 7.3  5.8 7.9   Eosinophil Rel % 1.2  2.1 3.0   Basophil Rel % 0.4  0.7 0.3   (A) Abnormal " value            CMP    CMP 1/31/22 7/12/22 8/1/22   Glucose 100 (A) 122 (A) 167 (A)   BUN 11 15 14   Creatinine 0.91 1.02 1.22   eGFR Non African Am 86     Sodium 141 139 139   Potassium 4.4 4.8 4.3   Chloride 106 103 105   Calcium 9.2 9.3 8.9   Albumin 4.00 4.30 3.50   Total Bilirubin 0.5 <0.2 0.2   Alkaline Phosphatase 53 55 44   AST (SGOT) 21 22 17   ALT (SGPT) 20 18 17   (A) Abnormal value            Lipase   Date Value Ref Range Status   07/12/2022 66 (H) 13 - 60 U/L Final     Iron   Date Value Ref Range Status   07/12/2022 63 59 - 158 mcg/dL Final     Transferrin   Date Value Ref Range Status   07/12/2022 301 200 - 360 mg/dL Final     Ferritin   Date Value Ref Range Status   07/12/2022 36.10 30.00 - 400.00 ng/mL Final     Ceruloplasmin   Date Value Ref Range Status   07/12/2022 25 16 - 31 mg/dL Final     Protime   Date Value Ref Range Status   08/01/2022 13.2 11.8 - 14.9 Seconds Final     INR   Date Value Ref Range Status   08/01/2022 0.99 0.86 - 1.15 Final     Ammonia   Date Value Ref Range Status   08/01/2022 33 16 - 60 umol/L Final             Assessment and Plan    Diagnoses and all orders for this visit:    1. Other cirrhosis of liver (HCC) (Primary)  -     CBC & Differential  -     Iron Profile; Future  -     Protime-INR; Future  -     Comprehensive Metabolic Panel; Future    2. Anemia, unspecified type    3. Left upper quadrant abdominal pain      * Surgery not found *     Cirrhosis: Nonalcoholic  Ascites: None noted  Varices: EGD upcoming in November for surveillance as well as anemia.  Encephalopathy: Mild confusion noted.  Most recent ammonia level normal.  Continue lactulose.    Follow Up   Return in about 6 months (around 4/10/2023).  Patient was given instructions and counseling regarding his condition or for health maintenance advice. Please see specific information pulled into the AVS if appropriate.

## 2022-10-11 DIAGNOSIS — D50.9 IRON DEFICIENCY ANEMIA, UNSPECIFIED IRON DEFICIENCY ANEMIA TYPE: Primary | ICD-10-CM

## 2022-10-11 RX ORDER — DOXYCYCLINE HYCLATE 50 MG/1
324 CAPSULE, GELATIN COATED ORAL
Qty: 30 TABLET | Refills: 5 | Status: SHIPPED | OUTPATIENT
Start: 2022-10-11

## 2022-10-12 ENCOUNTER — TELEPHONE (OUTPATIENT)
Dept: GASTROENTEROLOGY | Facility: CLINIC | Age: 57
End: 2022-10-12

## 2022-10-12 NOTE — TELEPHONE ENCOUNTER
----- Message from JALEN Link sent at 10/11/2022  3:35 PM EDT -----  Please let patient know that his iron levels are low.  I am sending in iron supplementation to pharmacy

## 2022-11-15 ENCOUNTER — TELEPHONE (OUTPATIENT)
Dept: GASTROENTEROLOGY | Facility: CLINIC | Age: 57
End: 2022-11-15

## 2022-11-23 RX ORDER — MELATONIN
1000 DAILY
COMMUNITY

## 2022-11-29 ENCOUNTER — ANESTHESIA EVENT (OUTPATIENT)
Dept: GASTROENTEROLOGY | Facility: HOSPITAL | Age: 57
End: 2022-11-29

## 2022-11-29 ENCOUNTER — HOSPITAL ENCOUNTER (OUTPATIENT)
Facility: HOSPITAL | Age: 57
Setting detail: HOSPITAL OUTPATIENT SURGERY
Discharge: HOME | End: 2022-11-29
Attending: INTERNAL MEDICINE | Admitting: INTERNAL MEDICINE

## 2022-11-29 ENCOUNTER — ANESTHESIA (OUTPATIENT)
Dept: GASTROENTEROLOGY | Facility: HOSPITAL | Age: 57
End: 2022-11-29

## 2022-11-29 VITALS
WEIGHT: 278.22 LBS | OXYGEN SATURATION: 96 % | SYSTOLIC BLOOD PRESSURE: 129 MMHG | HEART RATE: 86 BPM | DIASTOLIC BLOOD PRESSURE: 85 MMHG | RESPIRATION RATE: 20 BRPM | TEMPERATURE: 98 F | HEIGHT: 72 IN | BODY MASS INDEX: 37.68 KG/M2

## 2022-11-29 DIAGNOSIS — R11.0 NAUSEA: ICD-10-CM

## 2022-11-29 DIAGNOSIS — R10.12 LEFT UPPER QUADRANT ABDOMINAL PAIN: ICD-10-CM

## 2022-11-29 DIAGNOSIS — R19.5 CHANGE IN STOOL: ICD-10-CM

## 2022-11-29 DIAGNOSIS — R12 HEARTBURN: ICD-10-CM

## 2022-11-29 DIAGNOSIS — K74.60 NON-ALCOHOLIC CIRRHOSIS: ICD-10-CM

## 2022-11-29 LAB — GLUCOSE BLDC GLUCOMTR-MCNC: 122 MG/DL (ref 70–99)

## 2022-11-29 PROCEDURE — 88305 TISSUE EXAM BY PATHOLOGIST: CPT | Performed by: INTERNAL MEDICINE

## 2022-11-29 PROCEDURE — 82962 GLUCOSE BLOOD TEST: CPT

## 2022-11-29 PROCEDURE — 45380 COLONOSCOPY AND BIOPSY: CPT | Performed by: INTERNAL MEDICINE

## 2022-11-29 PROCEDURE — 43239 EGD BIOPSY SINGLE/MULTIPLE: CPT | Performed by: INTERNAL MEDICINE

## 2022-11-29 PROCEDURE — 25010000002 PROPOFOL 10 MG/ML EMULSION

## 2022-11-29 RX ORDER — SODIUM CHLORIDE, SODIUM LACTATE, POTASSIUM CHLORIDE, CALCIUM CHLORIDE 600; 310; 30; 20 MG/100ML; MG/100ML; MG/100ML; MG/100ML
30 INJECTION, SOLUTION INTRAVENOUS CONTINUOUS
Status: DISCONTINUED | OUTPATIENT
Start: 2022-11-29 | End: 2022-11-29 | Stop reason: HOSPADM

## 2022-11-29 RX ORDER — PROPOFOL 10 MG/ML
VIAL (ML) INTRAVENOUS AS NEEDED
Status: DISCONTINUED | OUTPATIENT
Start: 2022-11-29 | End: 2022-11-29 | Stop reason: SURG

## 2022-11-29 RX ORDER — PHENYLEPHRINE HCL IN 0.9% NACL 1 MG/10 ML
SYRINGE (ML) INTRAVENOUS AS NEEDED
Status: DISCONTINUED | OUTPATIENT
Start: 2022-11-29 | End: 2022-11-29 | Stop reason: SURG

## 2022-11-29 RX ORDER — LIDOCAINE HYDROCHLORIDE 20 MG/ML
INJECTION, SOLUTION EPIDURAL; INFILTRATION; INTRACAUDAL; PERINEURAL AS NEEDED
Status: DISCONTINUED | OUTPATIENT
Start: 2022-11-29 | End: 2022-11-29 | Stop reason: SURG

## 2022-11-29 RX ADMIN — PROPOFOL 200 MCG/KG/MIN: 10 INJECTION, EMULSION INTRAVENOUS at 09:57

## 2022-11-29 RX ADMIN — SODIUM CHLORIDE, POTASSIUM CHLORIDE, SODIUM LACTATE AND CALCIUM CHLORIDE 30 ML/HR: 600; 310; 30; 20 INJECTION, SOLUTION INTRAVENOUS at 09:46

## 2022-11-29 RX ADMIN — Medication 100 MCG: at 10:22

## 2022-11-29 RX ADMIN — PROPOFOL 100 MG: 10 INJECTION, EMULSION INTRAVENOUS at 09:57

## 2022-11-29 RX ADMIN — LIDOCAINE HYDROCHLORIDE 50 MG: 20 INJECTION, SOLUTION EPIDURAL; INFILTRATION; INTRACAUDAL; PERINEURAL at 09:57

## 2022-11-29 NOTE — ANESTHESIA POSTPROCEDURE EVALUATION
Patient: Edgardo Grayson    Procedure Summary     Date: 11/29/22 Room / Location: [facility] ENDOSCOPY 1 / [facility] ENDOSCOPY    Anesthesia Start: 0957 Anesthesia Stop: 1031    Procedures:       ESOPHAGOGASTRODUODENOSCOPY WITH BIOPSY      COLONOSCOPY WITH BIOPSIES Diagnosis:       Non-alcoholic cirrhosis (HCC)      Change in stool      Left upper quadrant abdominal pain      Heartburn      Nausea      (Non-alcoholic cirrhosis (HCC) [K74.60])      (Change in stool [R19.5])      (Left upper quadrant abdominal pain [R10.12])      (Heartburn [R12])      (Nausea [R11.0])    Surgeons: Patsy Carrillo MD Provider: Austyn Schuster MD    Anesthesia Type: general ASA Status: 3          Anesthesia Type: general    Vitals  Vitals Value Taken Time   /85 11/29/22 1052   Temp 36.7 °C (98 °F) 11/29/22 1052   Pulse 84 11/29/22 1053   Resp 20 11/29/22 1052   SpO2 97 % 11/29/22 1053   Vitals shown include unvalidated device data.        Post Anesthesia Care and Evaluation    Patient location during evaluation: bedside  Patient participation: complete - patient participated  Level of consciousness: awake  Pain management: adequate    Airway patency: patent  Anesthetic complications: No anesthetic complications  PONV Status: none  Cardiovascular status: acceptable and stable  Respiratory status: acceptable  Hydration status: acceptable    Comments: An Anesthesiologist personally participated in the most demanding procedures (including induction and emergence if applicable) in the anesthesia plan, monitored the course of anesthesia administration at frequent intervals and remained physically present and available for immediate diagnosis and treatment of emergencies.

## 2022-11-29 NOTE — ANESTHESIA PREPROCEDURE EVALUATION
Anesthesia Evaluation     Patient summary reviewed and Nursing notes reviewed   no history of anesthetic complications:  NPO Solid Status: > 8 hours  NPO Liquid Status: > 2 hours           Airway   Mallampati: III  TM distance: >3 FB  Neck ROM: full  Possible difficult intubation and Large neck circumference  Dental      Pulmonary - normal exam    breath sounds clear to auscultation  (+) sleep apnea,   Cardiovascular - normal exam  Exercise tolerance: good (4-7 METS)    Rhythm: regular  Rate: normal    (+) hypertension, hyperlipidemia,       Neuro/Psych  (+) headaches, psychiatric history,    GI/Hepatic/Renal/Endo    (+)   hepatitis, liver disease, diabetes mellitus type 2,     Musculoskeletal (-) negative ROS    Abdominal    Substance History - negative use     OB/GYN negative ob/gyn ROS         Other - negative ROS       ROS/Med Hx Other: PAT Nursing Notes unavailable.                   Anesthesia Plan    ASA 3     general     (Total IV Anesthesia    Patient understands anesthesia not responsible for dental damage.  )  intravenous induction     Anesthetic plan, risks, benefits, and alternatives have been provided, discussed and informed consent has been obtained with: patient.    Plan discussed with CRNA.        CODE STATUS:

## 2022-11-30 LAB
CYTO UR: NORMAL
LAB AP CASE REPORT: NORMAL
LAB AP CLINICAL INFORMATION: NORMAL
PATH REPORT.FINAL DX SPEC: NORMAL
PATH REPORT.GROSS SPEC: NORMAL

## 2022-12-05 DIAGNOSIS — K31.89 RETAINED FOOD IN STOMACH: Primary | ICD-10-CM

## 2022-12-06 ENCOUNTER — TELEPHONE (OUTPATIENT)
Dept: GASTROENTEROLOGY | Facility: CLINIC | Age: 57
End: 2022-12-06

## 2022-12-06 NOTE — TELEPHONE ENCOUNTER
----- Message from JALEN Link sent at 12/5/2022 12:27 PM EST -----  Stomach biopsies consistent with gastritis.  Continue PPI and avoid NSAIDs.  Food residue was noted in the stomach.  Ordering gastric emptying study for further evaluation.    Please place patient in colonRecall for 5 years given quality of bowel prep.    Schedule follow-up.

## 2022-12-06 NOTE — TELEPHONE ENCOUNTER
Spoke to pt and informed of Eliseo RAO result note and recommendations. Reviewed medications with pt and confirmed that pt is taking Aciphex. Educated and sent MyChart instructions on GES scheduled on 12/14/2022. Pt verified understanding.     Pt placed in f/u apt recall in relation to apt scheduled on 4/10/2023.

## 2022-12-12 ENCOUNTER — TELEPHONE (OUTPATIENT)
Dept: GASTROENTEROLOGY | Facility: CLINIC | Age: 57
End: 2022-12-12

## 2022-12-12 NOTE — TELEPHONE ENCOUNTER
Patient left a voicemail stating we need to send a PA request for Rabeprazole per chart this medication has been discontinued would you like me to complete the PA request?

## 2022-12-14 ENCOUNTER — HOSPITAL ENCOUNTER (OUTPATIENT)
Dept: NUCLEAR MEDICINE | Facility: HOSPITAL | Age: 57
Discharge: HOME | End: 2022-12-14
Admitting: NURSE PRACTITIONER

## 2022-12-14 DIAGNOSIS — K31.89 RETAINED FOOD IN STOMACH: ICD-10-CM

## 2022-12-14 PROCEDURE — A9541 TC99M SULFUR COLLOID: HCPCS | Performed by: NURSE PRACTITIONER

## 2022-12-14 PROCEDURE — 78264 GASTRIC EMPTYING IMG STUDY: CPT

## 2022-12-14 PROCEDURE — 0 TECHNETIUM SULFUR COLLOID: Performed by: NURSE PRACTITIONER

## 2022-12-14 RX ADMIN — TECHNETIUM TC 99M SULFUR COLLOID 1 DOSE: KIT at 07:58

## 2022-12-16 ENCOUNTER — TELEPHONE (OUTPATIENT)
Dept: GASTROENTEROLOGY | Facility: CLINIC | Age: 57
End: 2022-12-16

## 2022-12-19 RX ORDER — RABEPRAZOLE SODIUM 20 MG/1
20 TABLET, DELAYED RELEASE ORAL DAILY
Qty: 90 TABLET | Refills: 3 | Status: SHIPPED | OUTPATIENT
Start: 2022-12-19

## 2023-05-09 ENCOUNTER — TELEPHONE (OUTPATIENT)
Dept: GASTROENTEROLOGY | Facility: CLINIC | Age: 58
End: 2023-05-09
Payer: COMMERCIAL

## 2023-05-09 NOTE — TELEPHONE ENCOUNTER
Patient left a voice message today at 1:04, stating he received a letter telling him to stop the lactulose that the drug company that make this drug has filed bankruptcy They believed that some of the employees has tampered with the drug. He hates to stop taking it, but he will. He wants to know what he suppose to take. He would like a call back as soon as possible. He said he has the letter and will read it to you.

## 2023-05-10 NOTE — TELEPHONE ENCOUNTER
Spoke with patient and patient is going to send the letter in a [employer] message. The [employer] has shutdown and advised their patients not to take any new medications from a certain list as they believe that their employees have contaminated things.

## 2023-05-12 RX ORDER — LACTULOSE 10 G/15ML
15-30 SOLUTION ORAL 2 TIMES DAILY
Qty: 900 ML | Refills: 5 | Status: SHIPPED | OUTPATIENT
Start: 2023-05-12

## 2023-05-12 NOTE — TELEPHONE ENCOUNTER
Called patient and asked what pharmacy he would like for me to send the lactulose to. Patient's medicine was sent to [facility]

## 2023-05-22 ENCOUNTER — LAB (OUTPATIENT)
Dept: LAB | Facility: HOSPITAL | Age: 58
End: 2023-05-22
Payer: COMMERCIAL

## 2023-05-22 ENCOUNTER — OFFICE VISIT (OUTPATIENT)
Dept: GASTROENTEROLOGY | Facility: CLINIC | Age: 58
End: 2023-05-22
Payer: COMMERCIAL

## 2023-05-22 VITALS
HEART RATE: 87 BPM | HEIGHT: 72 IN | WEIGHT: 257 LBS | BODY MASS INDEX: 34.81 KG/M2 | DIASTOLIC BLOOD PRESSURE: 76 MMHG | SYSTOLIC BLOOD PRESSURE: 138 MMHG

## 2023-05-22 DIAGNOSIS — R11.0 NAUSEA: ICD-10-CM

## 2023-05-22 DIAGNOSIS — K76.82 HEPATIC ENCEPHALOPATHY: ICD-10-CM

## 2023-05-22 DIAGNOSIS — K74.60 CIRRHOSIS OF LIVER WITHOUT ASCITES, UNSPECIFIED HEPATIC CIRRHOSIS TYPE: ICD-10-CM

## 2023-05-22 DIAGNOSIS — K21.9 GASTROESOPHAGEAL REFLUX DISEASE, UNSPECIFIED WHETHER ESOPHAGITIS PRESENT: ICD-10-CM

## 2023-05-22 DIAGNOSIS — K74.60 CIRRHOSIS OF LIVER WITHOUT ASCITES, UNSPECIFIED HEPATIC CIRRHOSIS TYPE: Primary | ICD-10-CM

## 2023-05-22 LAB
ALBUMIN SERPL-MCNC: 4.3 G/DL (ref 3.5–5.2)
ALBUMIN/GLOB SERPL: 1.6 G/DL
ALP SERPL-CCNC: 54 U/L (ref 39–117)
ALPHA1 GLOB MFR UR ELPH: 140 MG/DL (ref 90–200)
ALT SERPL W P-5'-P-CCNC: 20 U/L (ref 1–41)
ANION GAP SERPL CALCULATED.3IONS-SCNC: 9.4 MMOL/L (ref 5–15)
AST SERPL-CCNC: 21 U/L (ref 1–40)
BASOPHILS # BLD AUTO: 0.04 10*3/MM3 (ref 0–0.2)
BASOPHILS NFR BLD AUTO: 0.6 % (ref 0–1.5)
BILIRUB SERPL-MCNC: 0.4 MG/DL (ref 0–1.2)
BUN SERPL-MCNC: 17 MG/DL (ref 6–20)
BUN/CREAT SERPL: 15.9 (ref 7–25)
CALCIUM SPEC-SCNC: 9.4 MG/DL (ref 8.6–10.5)
CHLORIDE SERPL-SCNC: 103 MMOL/L (ref 98–107)
CO2 SERPL-SCNC: 25.6 MMOL/L (ref 22–29)
CREAT SERPL-MCNC: 1.07 MG/DL (ref 0.76–1.27)
DEPRECATED RDW RBC AUTO: 41.1 FL (ref 37–54)
EGFRCR SERPLBLD CKD-EPI 2021: 79.9 ML/MIN/1.73
EOSINOPHIL # BLD AUTO: 0.09 10*3/MM3 (ref 0–0.4)
EOSINOPHIL NFR BLD AUTO: 1.4 % (ref 0.3–6.2)
ERYTHROCYTE [DISTWIDTH] IN BLOOD BY AUTOMATED COUNT: 12.7 % (ref 12.3–15.4)
FERRITIN SERPL-MCNC: 80.8 NG/ML (ref 30–400)
GLOBULIN UR ELPH-MCNC: 2.7 GM/DL
GLUCOSE SERPL-MCNC: 133 MG/DL (ref 65–99)
HAV IGM SERPL QL IA: NORMAL
HBV CORE IGM SERPL QL IA: NORMAL
HBV SURFACE AG SERPL QL IA: NORMAL
HCT VFR BLD AUTO: 45.2 % (ref 37.5–51)
HCV AB SER DONR QL: NORMAL
HGB BLD-MCNC: 14.8 G/DL (ref 13–17.7)
IMM GRANULOCYTES # BLD AUTO: 0.02 10*3/MM3 (ref 0–0.05)
IMM GRANULOCYTES NFR BLD AUTO: 0.3 % (ref 0–0.5)
INR PPP: 1.01 (ref 0.86–1.15)
IRON 24H UR-MRATE: 68 MCG/DL (ref 59–158)
IRON SATN MFR SERPL: 16 % (ref 20–50)
LYMPHOCYTES # BLD AUTO: 2.25 10*3/MM3 (ref 0.7–3.1)
LYMPHOCYTES NFR BLD AUTO: 35.3 % (ref 19.6–45.3)
MCH RBC QN AUTO: 29.2 PG (ref 26.6–33)
MCHC RBC AUTO-ENTMCNC: 32.7 G/DL (ref 31.5–35.7)
MCV RBC AUTO: 89.2 FL (ref 79–97)
MONOCYTES # BLD AUTO: 0.38 10*3/MM3 (ref 0.1–0.9)
MONOCYTES NFR BLD AUTO: 6 % (ref 5–12)
NEUTROPHILS NFR BLD AUTO: 3.6 10*3/MM3 (ref 1.7–7)
NEUTROPHILS NFR BLD AUTO: 56.4 % (ref 42.7–76)
NRBC BLD AUTO-RTO: 0 /100 WBC (ref 0–0.2)
PLATELET # BLD AUTO: 242 10*3/MM3 (ref 140–450)
PMV BLD AUTO: 10.3 FL (ref 6–12)
POTASSIUM SERPL-SCNC: 4 MMOL/L (ref 3.5–5.2)
PROT SERPL-MCNC: 7 G/DL (ref 6–8.5)
PROTHROMBIN TIME: 13.4 SECONDS (ref 11.8–14.9)
RBC # BLD AUTO: 5.07 10*6/MM3 (ref 4.14–5.8)
SODIUM SERPL-SCNC: 138 MMOL/L (ref 136–145)
TIBC SERPL-MCNC: 425 MCG/DL (ref 298–536)
TRANSFERRIN SERPL-MCNC: 285 MG/DL (ref 200–360)
WBC NRBC COR # BLD: 6.38 10*3/MM3 (ref 3.4–10.8)

## 2023-05-22 PROCEDURE — 82728 ASSAY OF FERRITIN: CPT

## 2023-05-22 PROCEDURE — 84466 ASSAY OF TRANSFERRIN: CPT

## 2023-05-22 PROCEDURE — 86038 ANTINUCLEAR ANTIBODIES: CPT | Performed by: NURSE PRACTITIONER

## 2023-05-22 PROCEDURE — 86015 ACTIN ANTIBODY EACH: CPT | Performed by: NURSE PRACTITIONER

## 2023-05-22 PROCEDURE — 85025 COMPLETE CBC W/AUTO DIFF WBC: CPT

## 2023-05-22 PROCEDURE — 36415 COLL VENOUS BLD VENIPUNCTURE: CPT | Performed by: NURSE PRACTITIONER

## 2023-05-22 PROCEDURE — 86225 DNA ANTIBODY NATIVE: CPT | Performed by: NURSE PRACTITIONER

## 2023-05-22 PROCEDURE — 83540 ASSAY OF IRON: CPT

## 2023-05-22 PROCEDURE — 86381 MITOCHONDRIAL ANTIBODY EACH: CPT | Performed by: NURSE PRACTITIONER

## 2023-05-22 PROCEDURE — 80053 COMPREHEN METABOLIC PANEL: CPT

## 2023-05-22 PROCEDURE — 80074 ACUTE HEPATITIS PANEL: CPT | Performed by: NURSE PRACTITIONER

## 2023-05-22 PROCEDURE — 82784 ASSAY IGA/IGD/IGG/IGM EACH: CPT

## 2023-05-22 PROCEDURE — 82103 ALPHA-1-ANTITRYPSIN TOTAL: CPT | Performed by: NURSE PRACTITIONER

## 2023-05-22 PROCEDURE — 85610 PROTHROMBIN TIME: CPT

## 2023-05-22 PROCEDURE — 86334 IMMUNOFIX E-PHORESIS SERUM: CPT

## 2023-05-22 RX ORDER — FAMOTIDINE 40 MG/1
40 TABLET, FILM COATED ORAL 2 TIMES DAILY
Qty: 180 TABLET | Refills: 1 | Status: SHIPPED | OUTPATIENT
Start: 2023-05-22

## 2023-05-22 RX ORDER — PANTOPRAZOLE SODIUM 40 MG/1
40 TABLET, DELAYED RELEASE ORAL DAILY
Qty: 90 TABLET | Refills: 1 | Status: SHIPPED | OUTPATIENT
Start: 2023-05-22 | End: 2023-08-20

## 2023-05-22 RX ORDER — SUCRALFATE 1 G/10ML
SUSPENSION ORAL
COMMUNITY
Start: 2023-05-11

## 2023-05-22 NOTE — PROGRESS NOTES
Chief Complaint     Cirrhosis    History of Present Illness     Edgardo Grayson is a 57 y.o. male who presents to [facility] GASTROENTEROLOGY for follow-up of cirrhosis, anemia and LUQ abdominal pain.      He reports intermittent nausea.  Will sometimes experience vomiting.  Prescribed erythromycin per PCP with some improvement.      Admits a history of pancreatitis.     Reports intermittent RUQ discomfort.      Admits generalized joint pain.  He's having a difficult time sleeping at night.  He is experiencing worsening reflux.      He reduced lactulose recently due to diarrhea.  He hasn't noticed any difference in confusion when reducing lactulose.      He is experiencing insomnia and will often take a nap during the day.       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      Past Surgical History:   Procedure Laterality Date   • ANAL FISSURECTOMY     • CARPAL TUNNEL RELEASE     • CHOLECYSTECTOMY     • COLONOSCOPY     • COLONOSCOPY N/A 11/29/2022    Procedure: COLONOSCOPY WITH BIOPSIES;  Surgeon: Patsy Carrillo MD;  Location: [facility] ENDOSCOPY;  Service: Gastroenterology;  Laterality: N/A;  NORMAL COLONOSCOPY   • ENDOSCOPY N/A 11/29/2022    Procedure: ESOPHAGOGASTRODUODENOSCOPY WITH BIOPSY;  Surgeon: Patsy Carrillo MD;  Location: [facility] ENDOSCOPY;  Service: Gastroenterology;  Laterality: N/A;  HIATAL HERNIA   • HIP ARTHROPLASTY      X2 Left    • LIVER BIOPSY     • POSTERIOR LUMBAR/THORACIC SPINE FUSION     • ROTATOR CUFF REPAIR     • TONSILLECTOMY     • UPPER GASTROINTESTINAL ENDOSCOPY       Family History   Problem Relation Age of Onset   • Malig Hyperthermia Neg Hx         Current Medications        Current Outpatient Medications:   •  albuterol sulfate  (90 Base) MCG/ACT inhaler, albuterol sulfate HFA 90 mcg/actuation aerosol inhaler  INHALE 2 PUFFS BY MOUTH EVERY 4 TO 6 HOURS AS NEEDED FOR COUGH WHEEZE OR SHORTNESS OF BREATH USE WITH VORTEX SPACER, Disp: , Rfl:   •  Apremilast (Otezla) 30 MG tablet, Otezla 30 mg tablet  TAKE 2 TABLETS BY MOUTH ONCE DAILY, Disp: , Rfl:   •  aspirin 81 MG EC tablet, Aspir-81 mg tablet,delayed release  Take 1 tablet every day by oral route., Disp: , Rfl:   •  azelastine (ASTELIN) 0.1 % nasal spray, azelastine 137 mcg (0.1 %) nasal spray aerosol  USE 1 TO 2 SPRAY(S) IN EACH NOSTRIL TWICE DAILY AS NEEDED, Disp: , Rfl:   •  baclofen (LIORESAL) 10 MG tablet, baclofen 10 mg tablet  TAKE 1 TABLET BY MOUTH 4 TIMES DAILY AS NEEDED, Disp: , Rfl:   •  BD Pen Needle Gavi 2nd Gen 32G X 4 MM misc, USE 5 TO 7 TIMES A DAY, Disp: , Rfl:   •  benazepril (LOTENSIN) 10 MG tablet, benazepril 10 mg tablet  TAKE 1 TABLET BY MOUTH ONCE DAILY, Disp: , Rfl:   •  calcipotriene (DOVONOX) 0.005 % ointment, calcipotriene 0.005 % topical ointment, Disp: , Rfl:   •  Carafate 1 GM/10ML suspension, TAKE 10 ML BY MOUTH 4 TIMES DAILY AS DIRECTED FOR 10 DAYS, Disp: , Rfl:   •  carbidopa-levodopa (SINEMET)  MG per tablet, carbidopa 25 mg-levodopa 100 mg tablet  TAKE 1 TABLET BY MOUTH EVERY DAY AT BEDTIME, Disp: , Rfl:   •  cetirizine (zyrTEC) 10 MG tablet, cetirizine 10 mg tablet  TAKE 1 TABLET BY MOUTH ONCE DAILY, Disp: , Rfl:   •  cholecalciferol (VITAMIN D3) 25 MCG (1000 UT) tablet, Take 1 tablet by mouth Daily., Disp: , Rfl:   •  citalopram (CeleXA) 20 MG tablet, Take 1 tablet by mouth Daily., Disp: , Rfl:   •  clobetasol (TEMOVATE) 0.05 % external solution, , Disp: , Rfl:   •  Continuous Blood Gluc Sensor (Dexcom G6 Sensor), , Disp: , Rfl:   •  Continuous Blood Gluc Transmit (Dexcom G6 Transmitter) misc, See Admin Instructions., Disp: , Rfl:   •  Cyanocobalamin 1000 MCG sublingual tablet,  cyanocobalamin (vit B-12) 1,000 mcg sublingual tablet  Place 1 tablet every day by sublingual route., Disp: , Rfl:   •  cyclobenzaprine (FLEXERIL) 10 MG tablet, Take  by mouth 3 (Three) Times a Day As Needed., Disp: , Rfl:   •  Diclofenac Sodium (VOLTAREN) 1 % gel gel, diclofenac 1 % topical gel  APPLY 2 GRAMS TOPICALLY TO THE AFFECTED AREA(S) FOUR TIMES DAILY, Disp: , Rfl:   •  EPINEPHrine (EPIPEN) 0.3 MG/0.3ML solution auto-injector injection, epinephrine 0.3 mg/0.3 mL injection, auto-injector  USE AS DIRECTED FOR ACUTE ALLERGIC REACTION, Disp: , Rfl:   •  famotidine (PEPCID) 40 MG tablet, Take 1 tablet by mouth 2 (Two) Times a Day., Disp: 180 tablet, Rfl: 1  •  ferrous gluconate (FERGON) 324 MG tablet, Take 1 tablet by mouth Daily With Breakfast., Disp: 30 tablet, Rfl: 5  •  fluocinonide (LIDEX) 0.05 % cream, Apply  topically to the appropriate area as directed Daily., Disp: , Rfl:   •  fluticasone (FLONASE) 50 MCG/ACT nasal spray, fluticasone propionate 50 mcg/actuation nasal spray,suspension  USE 2 SPRAY(S) IN EACH NOSTRIL ONCE DAILY, Disp: , Rfl:   •  folic acid (FOLVITE) 1 MG tablet, folic acid 1 mg tablet, Disp: , Rfl:   •  gabapentin (NEURONTIN) 300 MG capsule, Take 1 capsule by mouth Daily., Disp: , Rfl:   •  glucose blood test strip, Accu-Chek Mary Plus test strips, Disp: , Rfl:   •  hepatitis A (Havrix) 1440 EL U/ML vaccine, Havrix (PF) 1,440 DIXON unit/mL intramuscular syringe, Disp: , Rfl:   •  hydrocortisone 2.5 % lotion, hydrocortisone 2.5 % lotion  APPLY A THIN LAYER TO THE AFFECTED AREA(S) BY TOPICAL ROUTE ONCE DAILY, Disp: , Rfl:   •  influenza vac split quad (FLUZONE,FLUARIX,AFLURIA,FLULAVAL) 0.5 ML suspension prefilled syringe injection, Fluarix Quad 0029-9863 (PF) 60 mcg (15 mcg x 4)/0.5 mL IM syringe, Disp: , Rfl:   •  Insulin Aspart (NOVOLOG SC), Inject  under the skin into the appropriate area as directed. 32 units a.m.    30 units at lunch    40 units at hs, Disp: , Rfl:   •  Insulin Pen  "Needle (BD Pen Needle Gavi 2nd Gen) 32G X 4 MM misc, BD Gavi 2nd Gen Pen Needle 32 gauge x 5/32\"  USE 1 PEN NEEDLE 5-7 TIMES DAILY, Disp: , Rfl:   •  ketoconazole (NIZORAL) 2 % shampoo, ketoconazole 2 % shampoo  APPLY TOPICALLY TO SCALP 2 3 TIMES PER WEEK; APPLY TO DRY SCALP LET IT SIT FOR 15 20 MINUTES THEN RINSE, Disp: , Rfl:   •  lactulose (CHRONULAC) 10 GM/15ML solution, Take 15-30 mL by mouth 2 (Two) Times a Day. Goal of 2-3 bowel movements per day., Disp: 900 mL, Rfl: 5  •  magnesium oxide (MAG-OX) 400 MG tablet, 1 tablet., Disp: , Rfl:   •  meloxicam (MOBIC) 15 MG tablet, meloxicam 15 mg tablet  TAKE 1 TABLET BY MOUTH ONCE DAILY, Disp: , Rfl:   •  mupirocin (BACTROBAN) 2 % ointment, mupirocin 2 % topical ointment, Disp: , Rfl:   •  nitroglycerin (NITROSTAT) 0.4 MG SL tablet, nitroglycerin 0.4 mg sublingual tablet, Disp: , Rfl:   •  pramipexole (MIRAPEX) 0.125 MG tablet, pramipexole 0.125 mg tablet  TAKE 1 TABLET BY MOUTH TWICE DAILY, Disp: , Rfl:   •  rivastigmine (EXELON) 4.5 MG capsule, rivastigmine 4.5 mg capsule, Disp: , Rfl:   •  Semaglutide (OZEMPIC, 2 MG/DOSE, SC), Inject  under the skin into the appropriate area as directed 1 (One) Time Per Week., Disp: , Rfl:   •  SITagliptin (JANUVIA) 100 MG tablet, Januvia 100 mg tablet  TAKE 1 TABLET BY MOUTH ONCE DAILY FOR DIABETES, Disp: , Rfl:   •  Tdap (Adacel) 5-2-15.5 LF-MCG/0.5 injection, Adacel (Tdap Adolesn/Adult)(PF)2 Lf-(2.5-5-3-5)-5 Lf/0.5 mL IM syringe, Disp: , Rfl:   •  ubrogepant (UBRELVY) 100 MG tablet, Take 1 tablet by mouth., Disp: , Rfl:   •  pantoprazole (Protonix) 40 MG EC tablet, Take 1 tablet by mouth Daily for 90 days., Disp: 90 tablet, Rfl: 1  •  riFAXIMin (Xifaxan) 550 MG tablet, Take 1 tablet by mouth Every 12 (Twelve) Hours., Disp: 180 tablet, Rfl: 1     Allergies     No Known Allergies    Social History       Social History     Social History Narrative   • Not on file         Objective       /76 (BP Location: Left arm, Patient " "Position: Sitting, Cuff Size: Adult)   Pulse 87   Ht 182.9 cm (72\")   Wt 117 kg (257 lb)   BMI 34.86 kg/m²       Physical Exam    Results       Result Review :    The following data was reviewed by: JALEN Lerma on 05/22/2023:    CBC w/diff        7/13/2022    13:09 8/1/2022    08:46 10/10/2022    09:57   CBC w/Diff   WBC 7.05   6.05   5.84     RBC 4.80   4.34   4.84     Hemoglobin 14.0   12.5   13.5     Hematocrit 42.7   38.7   42.5     MCV 89.0   89.2   87.8     MCH 29.2   28.8   27.9     MCHC 32.8   32.3   31.8     RDW 13.2   13.0   13.3     Platelets 216      250   197   222     Neutrophil Rel % 57.2   43.9   62.0     Immature Granulocyte Rel % 0.4   0.3   0.0     Lymphocyte Rel % 33.8   44.6   29.6     Monocyte Rel % 5.8   7.9   6.2     Eosinophil Rel % 2.1   3.0   1.7     Basophil Rel % 0.7   0.3   0.5       CMP        7/12/2022    14:56 8/1/2022    08:46 10/10/2022    09:57   CMP   Glucose 122   167   179     BUN 15   14   14     Creatinine 1.02   1.22   0.99     EGFR 85.2   68.3   87.8     Sodium 139   139   138     Potassium 4.8   4.3   4.5     Chloride 103   105   106     Calcium 9.3   8.9   9.2     Total Protein 6.9   6.4   6.4     Albumin 4.30   3.50   4.00     Globulin 2.6   2.9   2.4     Total Bilirubin <0.2   0.2   0.3     Alkaline Phosphatase 55   44   53     AST (SGOT) 22   17   17     ALT (SGPT) 18   17   14     Albumin/Globulin Ratio 1.7   1.2   1.7     BUN/Creatinine Ratio 14.7   11.5   14.1     Anion Gap 13.4   8.5   6.0         Iron Profile   Iron   Date Value Ref Range Status   10/10/2022 44 (L) 59 - 158 mcg/dL Final     TIBC   Date Value Ref Range Status   10/10/2022 431 298 - 536 mcg/dL Final     Iron Saturation   Date Value Ref Range Status   10/10/2022 10 (L) 20 - 50 % Final     Transferrin   Date Value Ref Range Status   10/10/2022 289 200 - 360 mg/dL Final     11/29/2022 colonoscopy-Normal mucosa noted in the entire colon and terminal ileum.  Random colon biopsy-negative " for microscopic colitis.  Recommend repeat colonoscopy in 5 years given quality of bowel prep.  EGD-normal esophagus.  Small hiatal hernia.  Normal stomach.  Food found in the gastric body.  Normal duodenum.    12/14/2022 gastric emptying study-normal exam with 6% remaining at 4 hours postingestion.    6/7/2022 CT abdomen pelvis with and without contrast-cirrhotic morphology of the liver.  No visible liver or splenic lesions.           Assessment and Plan              Diagnoses and all orders for this visit:    1. Cirrhosis of liver without ascites, unspecified hepatic cirrhosis type (Primary)  -     CBC Auto Differential; Future  -     Comprehensive Metabolic Panel; Future  -     US Abdomen Limited; Future  -     Protime-INR; Future  -     riFAXIMin (Xifaxan) 550 MG tablet; Take 1 tablet by mouth Every 12 (Twelve) Hours.  Dispense: 180 tablet; Refill: 1  -     Hepatitis Panel, Acute  -     TEE  -     Alpha - 1 - Antitrypsin  -     Anti-Smooth Muscle Antibody Titer  -     Immunofixation, Serum; Future  -     Iron Profile; Future  -     Mitochondrial Antibodies, M2    2. Nausea    3. Hepatic encephalopathy  -     riFAXIMin (Xifaxan) 550 MG tablet; Take 1 tablet by mouth Every 12 (Twelve) Hours.  Dispense: 180 tablet; Refill: 1    4. Gastroesophageal reflux disease, unspecified whether esophagitis present  -     pantoprazole (Protonix) 40 MG EC tablet; Take 1 tablet by mouth Daily for 90 days.  Dispense: 90 tablet; Refill: 1  -     famotidine (PEPCID) 40 MG tablet; Take 1 tablet by mouth 2 (Two) Times a Day.  Dispense: 180 tablet; Refill: 1      Cirrhosis:  Previous bx c/w cirrhosis. Type II diabetic.  No previous liver labs done.  Ordered at today's visit.  Has been losing weight since starting ozempic.    Ascites:  None on exam.    Varices:  None on EGD 11/2022.    Encephalopathy:  Admits intermittent confusion and insomnia.  He is not taking lactulose routinely due to diarrhea.  Rx sent for xifaxin.     Due for RUQ  imaging.        Follow Up     Follow Up   Return in about 6 months (around 11/22/2023) for GERD.  Patient was given instructions and counseling regarding his condition or for health maintenance advice. Please see specific information pulled into the AVS if appropriate.

## 2023-05-23 LAB
IGA SERPL-MCNC: 248 MG/DL (ref 90–386)
IGG SERPL-MCNC: 998 MG/DL (ref 603–1613)
IGM SERPL-MCNC: 61 MG/DL (ref 20–172)
MITOCHONDRIA M2 IGG SER-ACNC: <20 UNITS (ref 0–20)
PROT PATTERN SERPL IFE-IMP: NORMAL
SMA IGG SER-ACNC: 7 UNITS (ref 0–19)

## 2023-05-25 LAB
DSDNA IGG SERPL IA-ACNC: NEGATIVE [IU]/ML
NUCLEAR IGG SER IA-RTO: NEGATIVE

## 2023-05-31 NOTE — TELEPHONE ENCOUNTER
Patient is reqesting a refill of ferrous gluconate 324mg, medication is pended      Last OV: 5/22/2023  Last Refill:4/19/2023  Next OV: 11/29/2023

## 2023-06-02 ENCOUNTER — TELEPHONE (OUTPATIENT)
Dept: GASTROENTEROLOGY | Facility: CLINIC | Age: 58
End: 2023-06-02

## 2023-06-02 RX ORDER — DOXYCYCLINE HYCLATE 50 MG/1
324 CAPSULE, GELATIN COATED ORAL
Qty: 30 TABLET | Refills: 5 | Status: SHIPPED | OUTPATIENT
Start: 2023-06-02

## 2023-06-02 NOTE — TELEPHONE ENCOUNTER
Left message with patient asking for a returned call to follow up on overdue results for imaging studies.

## 2023-09-18 RX ORDER — LACTULOSE 10 G/15ML
SOLUTION ORAL
Qty: 946 ML | Refills: 3 | Status: SHIPPED | OUTPATIENT
Start: 2023-09-18

## 2023-09-18 NOTE — TELEPHONE ENCOUNTER
Patient is requesting a refill of Lactulose 15ml, medication is pended     Last OV: 5/22/2023  Last refill: 5/12/2023  Next OV: 11/29/2023

## 2023-11-10 DIAGNOSIS — K21.9 GASTROESOPHAGEAL REFLUX DISEASE, UNSPECIFIED WHETHER ESOPHAGITIS PRESENT: ICD-10-CM

## 2023-11-10 NOTE — TELEPHONE ENCOUNTER
Patient requesting a refill of pantoprazole, order pended.  Last o/v-5/22/23  Last refill-8/20/23  Next o/v-11/29/23

## 2023-11-13 RX ORDER — PANTOPRAZOLE SODIUM 40 MG/1
40 TABLET, DELAYED RELEASE ORAL DAILY
Qty: 90 TABLET | Refills: 0 | Status: SHIPPED | OUTPATIENT
Start: 2023-11-13 | End: 2024-02-11

## 2023-11-29 ENCOUNTER — OFFICE VISIT (OUTPATIENT)
Dept: GASTROENTEROLOGY | Facility: CLINIC | Age: 58
End: 2023-11-29
Payer: COMMERCIAL

## 2023-11-29 VITALS
HEART RATE: 89 BPM | DIASTOLIC BLOOD PRESSURE: 80 MMHG | WEIGHT: 260 LBS | HEIGHT: 72 IN | SYSTOLIC BLOOD PRESSURE: 147 MMHG | BODY MASS INDEX: 35.21 KG/M2

## 2023-11-29 DIAGNOSIS — K74.60 NON-ALCOHOLIC CIRRHOSIS: Primary | ICD-10-CM

## 2023-11-29 DIAGNOSIS — K21.9 GASTROESOPHAGEAL REFLUX DISEASE, UNSPECIFIED WHETHER ESOPHAGITIS PRESENT: ICD-10-CM

## 2023-11-29 DIAGNOSIS — K76.82 HEPATIC ENCEPHALOPATHY: ICD-10-CM

## 2023-11-29 PROBLEM — I10 BENIGN ESSENTIAL HTN: Status: ACTIVE | Noted: 2021-03-22

## 2023-11-29 PROBLEM — I15.2 HYPERTENSION ASSOCIATED WITH TYPE 2 DIABETES MELLITUS: Status: ACTIVE | Noted: 2023-04-30

## 2023-11-29 PROBLEM — M19.90 ARTHRITIS: Status: ACTIVE | Noted: 2023-11-29

## 2023-11-29 PROBLEM — E11.59 HYPERTENSION ASSOCIATED WITH TYPE 2 DIABETES MELLITUS: Status: ACTIVE | Noted: 2023-04-30

## 2023-11-29 PROBLEM — E11.21 DIABETIC NEPHROPATHY ASSOCIATED WITH TYPE 2 DIABETES MELLITUS: Status: ACTIVE | Noted: 2021-01-11

## 2023-11-29 PROBLEM — F32.A DEPRESSIVE DISORDER: Status: ACTIVE | Noted: 2023-11-29

## 2023-11-29 RX ORDER — PANTOPRAZOLE SODIUM 40 MG/1
40 TABLET, DELAYED RELEASE ORAL DAILY
Qty: 90 TABLET | Refills: 1 | Status: SHIPPED | OUTPATIENT
Start: 2023-11-29 | End: 2024-02-27

## 2023-11-29 RX ORDER — RABEPRAZOLE SODIUM 20 MG/1
TABLET, DELAYED RELEASE ORAL
COMMUNITY
End: 2023-11-29

## 2023-11-29 RX ORDER — PREGABALIN 25 MG/1
1 CAPSULE ORAL 3 TIMES DAILY
COMMUNITY
Start: 2023-11-12

## 2023-11-29 RX ORDER — FAMOTIDINE 40 MG/1
40 TABLET, FILM COATED ORAL 2 TIMES DAILY
Qty: 180 TABLET | Refills: 1 | Status: SHIPPED | OUTPATIENT
Start: 2023-11-29

## 2023-11-29 NOTE — PROGRESS NOTES
Chief Complaint     Heartburn    History of Present Illness     Edgardo Grayson is a 58 y.o. male who presents to [facility] GASTRO[facility]OLOGY for follow-up of cirrhosis, nausea, GERD and HE.      He reports that he started xifaxin and noticed that it made him nauseous and he stopped taking it.  Since stopping it nausea is improved.  He was taking on an empty stomach.  He's taking 15-30 ml of lactulose daily.  Denies diarrhea.      He was attacked by a cow last month and hospitalized due to a crush injury to abdomen.      Heartburn is controlled with current medication regimen.         History      Past Medical History:   Diagnosis Date    Anxiety     Chronic headaches     Cirrhosis     Depression     Diabetes mellitus     Hyperlipidemia     Hypertension     Liver disease     Cirrhosis     Migraine     Muscle pain     Parkinson disease     Sleep apnea     Viral hepatitis A without hepatic coma      Past Surgical History:   Procedure Laterality Date    ANAL FISSURECTOMY      CARPAL TUNNEL RELEASE      CHOLECYSTECTOMY      COLONOSCOPY      COLONOSCOPY N/A 11/29/2022    Procedure: COLONOSCOPY WITH BIOPSIES;  Surgeon: Patsy Carrillo MD;  Location: [facility] ENDOSCOPY;  Service: Gastroenterology;  Laterality: N/A;  NORMAL COLONOSCOPY    ENDOSCOPY N/A 11/29/2022    Procedure: ESOPHAGOGASTRODUODENOSCOPY WITH BIOPSY;  Surgeon: Patsy Carrillo MD;  Location: [facility] ENDOSCOPY;  Service: Gastroenterology;  Laterality: N/A;  HIATAL HERNIA    HIP ARTHROPLASTY      X2 Left     LIVER BIOPSY      POSTERIOR LUMBAR/THORACIC SPINE FUSION      ROTATOR CUFF REPAIR      TONSILLECTOMY      UPPER GASTROINTESTINAL ENDOSCOPY       Family History   Problem Relation Age of Onset    Malig Hyperthermia Neg Hx         Current Medications       Current Outpatient Medications:     albuterol sulfate  (90 Base) MCG/ACT inhaler, albuterol sulfate HFA 90 mcg/actuation aerosol inhaler  INHALE 2 PUFFS BY MOUTH EVERY 4 TO  6 HOURS AS NEEDED FOR COUGH WHEEZE OR SHORTNESS OF BREATH USE WITH VORTEX SPACER, Disp: , Rfl:     Apremilast (Otezla) 30 MG tablet, Otezla 30 mg tablet  TAKE 2 TABLETS BY MOUTH ONCE DAILY, Disp: , Rfl:     aspirin 81 MG EC tablet, Aspir-81 mg tablet,delayed release  Take 1 tablet every day by oral route., Disp: , Rfl:     azelastine (ASTELIN) 0.1 % nasal spray, azelastine 137 mcg (0.1 %) nasal spray aerosol  USE 1 TO 2 SPRAY(S) IN EACH NOSTRIL TWICE DAILY AS NEEDED, Disp: , Rfl:     baclofen (LIORESAL) 10 MG tablet, baclofen 10 mg tablet  TAKE 1 TABLET BY MOUTH 4 TIMES DAILY AS NEEDED, Disp: , Rfl:     BD Pen Needle Gavi 2nd Gen 32G X 4 MM misc, USE 5 TO 7 TIMES A DAY, Disp: , Rfl:     benazepril (LOTENSIN) 10 MG tablet, benazepril 10 mg tablet  TAKE 1 TABLET BY MOUTH ONCE DAILY, Disp: , Rfl:     calcipotriene (DOVONOX) 0.005 % ointment, calcipotriene 0.005 % topical ointment, Disp: , Rfl:     carbidopa-levodopa (SINEMET)  MG per tablet, carbidopa 25 mg-levodopa 100 mg tablet  TAKE 1 TABLET BY MOUTH EVERY DAY AT BEDTIME, Disp: , Rfl:     cetirizine (zyrTEC) 10 MG tablet, cetirizine 10 mg tablet  TAKE 1 TABLET BY MOUTH ONCE DAILY, Disp: , Rfl:     cholecalciferol (VITAMIN D3) 25 MCG (1000 UT) tablet, Take 1 tablet by mouth Daily., Disp: , Rfl:     citalopram (CeleXA) 20 MG tablet, Take 1 tablet by mouth Daily., Disp: , Rfl:     clobetasol (TEMOVATE) 0.05 % external solution, , Disp: , Rfl:     Continuous Blood Gluc Sensor (Dexcom G6 Sensor), , Disp: , Rfl:     Continuous Blood Gluc Transmit (Dexcom G6 Transmitter) misc, See Admin Instructions., Disp: , Rfl:     Cyanocobalamin 1000 MCG sublingual tablet, cyanocobalamin (vit B-12) 1,000 mcg sublingual tablet  Place 1 tablet every day by sublingual route., Disp: , Rfl:     cyclobenzaprine (FLEXERIL) 10 MG tablet, Take  by mouth 3 (Three) Times a Day As Needed., Disp: , Rfl:     Diclofenac Sodium (VOLTAREN) 1 % gel gel, diclofenac 1 % topical gel  APPLY 2 GRAMS  "TOPICALLY TO THE AFFECTED AREA(S) FOUR TIMES DAILY, Disp: , Rfl:     EPINEPHrine (EPIPEN) 0.3 MG/0.3ML solution auto-injector injection, epinephrine 0.3 mg/0.3 mL injection, auto-injector  USE AS DIRECTED FOR ACUTE ALLERGIC REACTION, Disp: , Rfl:     famotidine (PEPCID) 40 MG tablet, Take 1 tablet by mouth 2 (Two) Times a Day., Disp: 180 tablet, Rfl: 1    ferrous gluconate (FERGON) 324 MG tablet, Take 1 tablet by mouth Daily With Breakfast., Disp: 30 tablet, Rfl: 5    fluocinonide (LIDEX) 0.05 % cream, Apply  topically to the appropriate area as directed Daily., Disp: , Rfl:     fluticasone (FLONASE) 50 MCG/ACT nasal spray, fluticasone propionate 50 mcg/actuation nasal spray,suspension  USE 2 SPRAY(S) IN EACH NOSTRIL ONCE DAILY, Disp: , Rfl:     folic acid (FOLVITE) 1 MG tablet, folic acid 1 mg tablet, Disp: , Rfl:     gabapentin (NEURONTIN) 300 MG capsule, Take 1 capsule by mouth Daily., Disp: , Rfl:     glucose blood test strip, Accu-Chek Mary Plus test strips, Disp: , Rfl:     hepatitis A (Havrix) 1440 EL U/ML vaccine, Havrix (PF) 1,440 DIXON unit/mL intramuscular syringe, Disp: , Rfl:     hydrocortisone 2.5 % lotion, hydrocortisone 2.5 % lotion  APPLY A THIN LAYER TO THE AFFECTED AREA(S) BY TOPICAL ROUTE ONCE DAILY, Disp: , Rfl:     influenza vac split quad (FLUZONE,FLUARIX,AFLURIA,FLULAVAL) 0.5 ML suspension prefilled syringe injection, Fluarix Quad 0302-3924 (PF) 60 mcg (15 mcg x 4)/0.5 mL IM syringe, Disp: , Rfl:     Insulin Aspart (NOVOLOG SC), Inject  under the skin into the appropriate area as directed. 32 units a.m.    30 units at lunch    40 units at hs, Disp: , Rfl:     Insulin Pen Needle (BD Pen Needle Gavi 2nd Gen) 32G X 4 MM misc, BD Gavi 2nd Gen Pen Needle 32 gauge x 5/32\"  USE 1 PEN NEEDLE 5-7 TIMES DAILY, Disp: , Rfl:     ketoconazole (NIZORAL) 2 % shampoo, ketoconazole 2 % shampoo  APPLY TOPICALLY TO SCALP 2 3 TIMES PER WEEK; APPLY TO DRY SCALP LET IT SIT FOR 15 20 MINUTES THEN RINSE, Disp: , " "Rfl:     lactulose (CHRONULAC) 10 GM/15ML solution, TAKE 15 TO 30 MILLILITERS BY MOUTH TWO TIMES A DAY . GOAL OF 2 TO 3 BOWEL MOVEMENTS PER DAY., Disp: 946 mL, Rfl: 3    magnesium oxide (MAG-OX) 400 MG tablet, 1 tablet., Disp: , Rfl:     meloxicam (MOBIC) 15 MG tablet, meloxicam 15 mg tablet  TAKE 1 TABLET BY MOUTH ONCE DAILY, Disp: , Rfl:     mupirocin (BACTROBAN) 2 % ointment, mupirocin 2 % topical ointment, Disp: , Rfl:     nitroglycerin (NITROSTAT) 0.4 MG SL tablet, nitroglycerin 0.4 mg sublingual tablet, Disp: , Rfl:     pantoprazole (PROTONIX) 40 MG EC tablet, Take 1 tablet by mouth Daily for 90 days., Disp: 90 tablet, Rfl: 1    pramipexole (MIRAPEX) 0.125 MG tablet, pramipexole 0.125 mg tablet  TAKE 1 TABLET BY MOUTH TWICE DAILY, Disp: , Rfl:     pregabalin (LYRICA) 25 MG capsule, Take 1 capsule by mouth 3 times a day., Disp: , Rfl:     riFAXIMin (Xifaxan) 550 MG tablet, Take 1 tablet by mouth Every 12 (Twelve) Hours., Disp: 180 tablet, Rfl: 1    rivastigmine (EXELON) 4.5 MG capsule, rivastigmine 4.5 mg capsule, Disp: , Rfl:     Semaglutide (OZEMPIC, 2 MG/DOSE, SC), Inject  under the skin into the appropriate area as directed 1 (One) Time Per Week., Disp: , Rfl:     SITagliptin (JANUVIA) 100 MG tablet, Januvia 100 mg tablet  TAKE 1 TABLET BY MOUTH ONCE DAILY FOR DIABETES, Disp: , Rfl:     Tdap (Adacel) 5-2-15.5 LF-MCG/0.5 injection, Adacel (Tdap Adolesn/Adult)(PF)2 Lf-(2.5-5-3-5)-5 Lf/0.5 mL IM syringe, Disp: , Rfl:     ubrogepant (UBRELVY) 100 MG tablet, Take 1 tablet by mouth., Disp: , Rfl:      Allergies     No Known Allergies    Social History       Social History     Social History Narrative    Not on file         Objective       /80 (BP Location: Left arm, Patient Position: Sitting, Cuff Size: Adult)   Pulse 89   Ht 182.9 cm (72\")   Wt 118 kg (260 lb)   BMI 35.26 kg/m²       Physical Exam    Results       Result Review :    The following data was reviewed by: JALEN Lerma on " 11/29/2023:    CBC w/diff          5/22/2023    14:48   CBC w/Diff   WBC 6.38    RBC 5.07    Hemoglobin 14.8    Hematocrit 45.2    MCV 89.2    MCH 29.2    MCHC 32.7    RDW 12.7    Platelets 242    Neutrophil Rel % 56.4    Immature Granulocyte Rel % 0.3    Lymphocyte Rel % 35.3    Monocyte Rel % 6.0    Eosinophil Rel % 1.4    Basophil Rel % 0.6      CMP          5/22/2023    14:48   CMP   Glucose 133    BUN 17    Creatinine 1.07    EGFR 79.9    Sodium 138    Potassium 4.0    Chloride 103    Calcium 9.4    Total Protein 7.0    Albumin 4.3    Globulin 2.7    Total Bilirubin 0.4    Alkaline Phosphatase 54    AST (SGOT) 21    ALT (SGPT) 20    Albumin/Globulin Ratio 1.6    BUN/Creatinine Ratio 15.9    Anion Gap 9.4        Liver Workup   ALPHA -1 ANTITRYPSIN   Date Value Ref Range Status   05/22/2023 140 90 - 200 mg/dL Final     dsDNA   Date Value Ref Range Status   05/22/2023 Negative Negative Final     Expanded JERROD Screen   Date Value Ref Range Status   05/22/2023 Negative Negative Final     Smooth Muscle Ab   Date Value Ref Range Status   05/22/2023 7 0 - 19 Units Final     Comment:                      Negative                     0 - 19                   Weak positive               20 - 30                   Moderate to strong positive     >30   Actin Antibodies are found in 52-85% of patients with   autoimmune hepatitis or chronic active hepatitis and   in 22% of patients with primary biliary cirrhosis.     Ceruloplasmin   Date Value Ref Range Status   07/12/2022 25 16 - 31 mg/dL Final     Ferritin   Date Value Ref Range Status   05/22/2023 80.80 30.00 - 400.00 ng/mL Final     Immunofixation Result, Serum   Date Value Ref Range Status   05/22/2023 Comment  Final     Comment:     No monoclonality detected.     IgG   Date Value Ref Range Status   05/22/2023 998 603 - 1613 mg/dL Final     IgA   Date Value Ref Range Status   05/22/2023 248 90 - 386 mg/dL Final     IgM   Date Value Ref Range Status   05/22/2023 61 20 - 172  mg/dL Final     Iron   Date Value Ref Range Status   05/22/2023 68 59 - 158 mcg/dL Final     TIBC   Date Value Ref Range Status   05/22/2023 425 298 - 536 mcg/dL Final     Iron Saturation (TSAT)   Date Value Ref Range Status   05/22/2023 16 (L) 20 - 50 % Final     Transferrin   Date Value Ref Range Status   05/22/2023 285 200 - 360 mg/dL Final     Mitochondrial Ab   Date Value Ref Range Status   05/22/2023 <20.0 0.0 - 20.0 Units Final     Comment:                                     Negative    0.0 - 20.0                                  Equivocal  20.1 - 24.9                                  Positive         >24.9  Mitochondrial (M2) Antibodies are found in 90-96% of  patients with primary biliary cirrhosis.     Protime   Date Value Ref Range Status   05/22/2023 13.4 11.8 - 14.9 Seconds Final   03/05/2019 11.8 10.3 - 13.3 s Final     Comment:     (note)  Anticoagulants may alter the results of Laboratory   Coagulation assays. New-generation anticoagulants such as   direct Thrombin inhibitors (Dabigatran/Pradaxa, Argatroban,   Bivalrudin) and direct/indirect factor Xa inhibitors   (Rivaroxaban/Xarelto,Apixaban/Eliquis, Danaparoid/Orgaran,   Fondaparinux/Arixtra) have been shown to affect the results   of Laboratory Coagulation assays, creating falsely elevated   or decreased values. Correlation with medication history is   advised.     INR   Date Value Ref Range Status   05/22/2023 1.01 0.86 - 1.15 Final   03/05/2019 1.0 INR Final     Comment:     INR Therapeutic Ranges:  Low Intensity Range: 2.0-3.0  High Intensity Range:  3.0-4.5     6/1/2023 right upper quadrant ultrasound-increased echogenicity throughout the hepatic parenchyma is indicative of hepatic cirrhosis.  No hepatic mass.  Intrahepatic bile duct is normal caliber.  No ascites.       Assessment and Plan              Diagnoses and all orders for this visit:    1. Non-alcoholic cirrhosis (Primary)  -     US Abdomen Limited; Future    2. Hepatic  encephalopathy    3. Gastroesophageal reflux disease, unspecified whether esophagitis present  -     pantoprazole (PROTONIX) 40 MG EC tablet; Take 1 tablet by mouth Daily for 90 days.  Dispense: 90 tablet; Refill: 1  -     famotidine (PEPCID) 40 MG tablet; Take 1 tablet by mouth 2 (Two) Times a Day.  Dispense: 180 tablet; Refill: 1        Cirrhosis:  Etiology - type II diabetes.    Ascites:  None on exam.    Varices:  None on EGD 11/2022.    Encephalopathy:  Admits intermittent confusion and insomnia.  He is now taking lactulose routinely.  Encouraged him to try xifaxin with food to see if this reduces GI upset.           Follow Up     Follow Up   Return in about 6 months (around 5/29/2024) for Cirrhosis.  Patient was given instructions and counseling regarding his condition or for health maintenance advice. Please see specific information pulled into the AVS if appropriate.

## 2023-11-30 ENCOUNTER — TELEPHONE (OUTPATIENT)
Dept: GASTROENTEROLOGY | Facility: CLINIC | Age: 58
End: 2023-11-30
Payer: COMMERCIAL

## 2023-11-30 NOTE — TELEPHONE ENCOUNTER
Called and left detailed message about scheduled ultrasound at [place], scheduled for 12/08/2023 at 10:00 am, need to arrive by 9:30 am and nothing to eat or drink after midnight the night before.

## 2023-12-13 ENCOUNTER — TELEPHONE (OUTPATIENT)
Dept: GASTROENTEROLOGY | Facility: CLINIC | Age: 58
End: 2023-12-13
Payer: COMMERCIAL

## 2023-12-13 NOTE — TELEPHONE ENCOUNTER
----- Message from JALEN Lerma sent at 12/11/2023  4:17 PM EST -----  US c/w known cirrhosis.  Recommend repeat in 6 months for surveillance.

## 2024-05-01 RX ORDER — FERROUS GLUCONATE 324(38)MG
1 TABLET ORAL
Qty: 30 TABLET | Refills: 0 | Status: SHIPPED | OUTPATIENT
Start: 2024-05-01

## 2024-05-01 NOTE — TELEPHONE ENCOUNTER
Request for refill of fergon, order pended.  Last o/v-11/29/23  Last refill-6/2/23  Next o/v-5/30/24

## 2024-05-16 ENCOUNTER — TELEPHONE (OUTPATIENT)
Dept: GASTROENTEROLOGY | Facility: CLINIC | Age: 59
End: 2024-05-16
Payer: COMMERCIAL

## 2024-05-16 DIAGNOSIS — K76.82 HEPATIC ENCEPHALOPATHY: ICD-10-CM

## 2024-05-16 DIAGNOSIS — K74.60 CIRRHOSIS OF LIVER WITHOUT ASCITES, UNSPECIFIED HEPATIC CIRRHOSIS TYPE: ICD-10-CM

## 2024-05-16 RX ORDER — RIFAXIMIN 550 MG/1
550 TABLET ORAL EVERY 12 HOURS
Qty: 180 TABLET | Refills: 0 | Status: SHIPPED | OUTPATIENT
Start: 2024-05-16

## 2024-05-16 NOTE — TELEPHONE ENCOUNTER
Pt is requesting xifaxan. Order pended.   Last ov: 11/29/23  Next ov: 5/30/24  Last refill: 5/22/23

## 2024-05-30 ENCOUNTER — TELEPHONE (OUTPATIENT)
Dept: GASTROENTEROLOGY | Facility: CLINIC | Age: 59
End: 2024-05-30

## 2024-05-30 ENCOUNTER — OFFICE VISIT (OUTPATIENT)
Dept: GASTROENTEROLOGY | Facility: CLINIC | Age: 59
End: 2024-05-30
Payer: COMMERCIAL

## 2024-05-30 VITALS
HEART RATE: 92 BPM | BODY MASS INDEX: 35.24 KG/M2 | HEIGHT: 72 IN | DIASTOLIC BLOOD PRESSURE: 86 MMHG | SYSTOLIC BLOOD PRESSURE: 119 MMHG | WEIGHT: 260.2 LBS

## 2024-05-30 DIAGNOSIS — K74.60 LIVER CIRRHOSIS SECONDARY TO NASH: Primary | ICD-10-CM

## 2024-05-30 DIAGNOSIS — K21.9 GASTROESOPHAGEAL REFLUX DISEASE, UNSPECIFIED WHETHER ESOPHAGITIS PRESENT: ICD-10-CM

## 2024-05-30 DIAGNOSIS — K75.81 LIVER CIRRHOSIS SECONDARY TO NASH: Primary | ICD-10-CM

## 2024-05-30 DIAGNOSIS — K76.82 HEPATIC ENCEPHALOPATHY: ICD-10-CM

## 2024-05-30 RX ORDER — GLUCAGON INJECTION, SOLUTION 0.5 MG/.1ML
INJECTION, SOLUTION SUBCUTANEOUS
COMMUNITY
Start: 2023-12-18

## 2024-05-30 RX ORDER — CALCIPOTRIENE AND BETAMETHASONE DIPROPIONATE 50; .5 UG/G; MG/G
SUSPENSION TOPICAL
COMMUNITY

## 2024-05-30 RX ORDER — PREDNISOLONE ACETATE 10 MG/ML
SUSPENSION/ DROPS OPHTHALMIC
COMMUNITY

## 2024-05-30 RX ORDER — POLYETHYLENE GLYCOL 3350 17 G/17G
POWDER, FOR SOLUTION ORAL
COMMUNITY

## 2024-05-30 RX ORDER — DEXTROMETHORPHAN HYDROBROMIDE AND PROMETHAZINE HYDROCHLORIDE 15; 6.25 MG/5ML; MG/5ML
SYRUP ORAL
COMMUNITY

## 2024-05-30 RX ORDER — PROMETHAZINE HYDROCHLORIDE 25 MG/1
TABLET ORAL
COMMUNITY

## 2024-05-30 RX ORDER — BUDESONIDE AND FORMOTEROL FUMARATE DIHYDRATE 160; 4.5 UG/1; UG/1
AEROSOL RESPIRATORY (INHALATION)
COMMUNITY

## 2024-05-30 RX ORDER — TADALAFIL 5 MG/1
5 TABLET ORAL
COMMUNITY

## 2024-05-30 RX ORDER — LEVONORGESTREL/ETHIN.ESTRADIOL 0.15-0.03
TABLET ORAL
COMMUNITY
Start: 2024-05-20

## 2024-05-30 RX ORDER — PREDNISONE 10 MG/1
TABLET ORAL
COMMUNITY

## 2024-05-30 RX ORDER — OFLOXACIN 3 MG/ML
SOLUTION/ DROPS OPHTHALMIC
COMMUNITY
Start: 2024-05-18

## 2024-05-30 RX ORDER — METHOTREXATE 2.5 MG/1
TABLET ORAL
COMMUNITY

## 2024-05-30 RX ORDER — TRIAMCINOLONE ACETONIDE 0.25 MG/G
CREAM TOPICAL
COMMUNITY
Start: 2024-02-14

## 2024-05-30 RX ORDER — FAMOTIDINE 40 MG/1
40 TABLET, FILM COATED ORAL 2 TIMES DAILY
Qty: 180 TABLET | Refills: 1 | Status: SHIPPED | OUTPATIENT
Start: 2024-05-30

## 2024-05-30 RX ORDER — SIMETHICONE 80 MG
TABLET,CHEWABLE ORAL
COMMUNITY

## 2024-05-30 RX ORDER — LACTULOSE 10 G/15ML
20 SOLUTION ORAL 2 TIMES DAILY
Qty: 5400 ML | Refills: 1 | Status: SHIPPED | OUTPATIENT
Start: 2024-05-30 | End: 2024-08-28

## 2024-05-30 RX ORDER — TRAMADOL HYDROCHLORIDE 50 MG/1
TABLET ORAL
COMMUNITY
Start: 2024-05-24

## 2024-05-30 RX ORDER — CEPHALEXIN 500 MG/1
CAPSULE ORAL
COMMUNITY
Start: 2024-01-29

## 2024-05-30 RX ORDER — INSULIN DEGLUDEC 200 U/ML
INJECTION, SOLUTION SUBCUTANEOUS
COMMUNITY
Start: 2024-05-03

## 2024-05-30 RX ORDER — SIMVASTATIN 40 MG
40 TABLET ORAL
COMMUNITY

## 2024-05-30 RX ORDER — VEHICLE BASE NO.17
CREAM (GRAM) MISCELLANEOUS
COMMUNITY

## 2024-05-30 RX ORDER — TIOTROPIUM BROMIDE INHALATION SPRAY 1.56 UG/1
SPRAY, METERED RESPIRATORY (INHALATION)
COMMUNITY

## 2024-05-30 NOTE — TELEPHONE ENCOUNTER
Contacted patient and left a detailed message that the Ultrasound has been scheduled at [place] on 06/19/2024 at 0830 am arrival time of 0800 am. Nothing to eat or drink after midnight. If this date does not work please call [phone] and get this rescheduled.

## 2024-05-30 NOTE — PROGRESS NOTES
Chief Complaint     Cirrhosis (6m )    History of Present Illness     Edgardo Grayson is a 58 y.o. male who presents to [facility] for follow-up of cirrhosis, HE and GERD.    Reports compliance with xifaxin and lactulose.      Admits fatigue.  He's noticed some confusion and forgetfullness.  His wife reports that he has a lot on his plate and she thinks he's doing too much.      He had a recent fibroscan at [facility].         History      Past Medical History:   Diagnosis Date    Anxiety     Chronic headaches     Cirrhosis     Depression     Diabetes mellitus     Hyperlipidemia     Hypertension     Liver disease     Cirrhosis     Migraine     Muscle pain     Parkinson disease     Sleep apnea     Viral hepatitis A without hepatic coma      Past Surgical History:   Procedure Laterality Date    ANAL FISSURECTOMY      CARPAL TUNNEL RELEASE      CHOLECYSTECTOMY      COLONOSCOPY      COLONOSCOPY N/A 11/29/2022    Procedure: COLONOSCOPY WITH BIOPSIES;  Surgeon: Patsy Carrillo MD;  Location: [facility];  Service: Gastroenterology;  Laterality: N/A;  NORMAL COLONOSCOPY    ENDOSCOPY N/A 11/29/2022    Procedure: ESOPHAGOGASTRODUODENOSCOPY WITH BIOPSY;  Surgeon: Patsy Carrillo MD;  Location: [facility] ENDOSCOPY;  Service: Gastroenterology;  Laterality: N/A;  HIATAL HERNIA    HIP ARTHROPLASTY      X2 Left     LIVER BIOPSY      POSTERIOR LUMBAR/THORACIC SPINE FUSION      ROTATOR CUFF REPAIR      TONSILLECTOMY      UPPER GASTROINTESTINAL ENDOSCOPY       Family History   Problem Relation Age of Onset    Malig Hyperthermia Neg Hx         Current Medications       Current Outpatient Medications:     albuterol sulfate  (90 Base) MCG/ACT inhaler, albuterol sulfate HFA 90 mcg/actuation aerosol inhaler  INHALE 2 PUFFS BY MOUTH EVERY 4 TO 6 HOURS AS NEEDED FOR COUGH WHEEZE OR SHORTNESS OF BREATH USE WITH VORTEX SPACER, Disp: , Rfl:     Apremilast (Otezla) 30 MG tablet, Otezla 30 mg  tablet  TAKE 2 TABLETS BY MOUTH ONCE DAILY, Disp: , Rfl:     Aspirin (BUFFERIN LOW DOSE PO), 81 mg., Disp: , Rfl:     aspirin 81 MG EC tablet, Aspir-81 mg tablet,delayed release  Take 1 tablet every day by oral route., Disp: , Rfl:     azelastine (ASTELIN) 0.1 % nasal spray, azelastine 137 mcg (0.1 %) nasal spray aerosol  USE 1 TO 2 SPRAY(S) IN EACH NOSTRIL TWICE DAILY AS NEEDED, Disp: , Rfl:     baclofen (LIORESAL) 10 MG tablet, baclofen 10 mg tablet  TAKE 1 TABLET BY MOUTH 4 TIMES DAILY AS NEEDED, Disp: , Rfl:     BD Pen Needle Gavi 2nd Gen 32G X 4 MM misc, USE 5 TO 7 TIMES A DAY, Disp: , Rfl:     benazepril (LOTENSIN) 10 MG tablet, benazepril 10 mg tablet  TAKE 1 TABLET BY MOUTH ONCE DAILY, Disp: , Rfl:     budesonide-formoterol (SYMBICORT) 160-4.5 MCG/ACT inhaler, Symbicort 160 mcg-4.5 mcg/actuation HFA aerosol inhaler  INHALE 2 PUFFS BY MOUTH EVERY 12 HOURS. USE WITH SPACER. RINSE MOUTH AFTER EACH USE, Disp: , Rfl:     calcipotriene (DOVONOX) 0.005 % ointment, calcipotriene 0.005 % topical ointment, Disp: , Rfl:     calcipotriene-betamethasone (TACLONEX SCALP) 0.005-0.064 % external suspension, APPLY SUSPENSION TOPICALLY TWICE DAILY TO THE SCALP, Disp: , Rfl:     carbidopa-levodopa (SINEMET)  MG per tablet, carbidopa 25 mg-levodopa 100 mg tablet  TAKE 1 TABLET BY MOUTH EVERY DAY AT BEDTIME, Disp: , Rfl:     cephalexin (KEFLEX) 500 MG capsule, , Disp: , Rfl:     cetirizine (zyrTEC) 10 MG tablet, cetirizine 10 mg tablet  TAKE 1 TABLET BY MOUTH ONCE DAILY, Disp: , Rfl:     cholecalciferol (VITAMIN D3) 25 MCG (1000 UT) tablet, Take 1 tablet by mouth Daily., Disp: , Rfl:     citalopram (CeleXA) 20 MG tablet, Take 1 tablet by mouth Daily., Disp: , Rfl:     clobetasol (TEMOVATE) 0.05 % external solution, , Disp: , Rfl:     Continuous Blood Gluc Sensor (Dexcom G6 Sensor), , Disp: , Rfl:     Continuous Blood Gluc Transmit (Dexcom G6 Transmitter) misc, See Admin Instructions., Disp: , Rfl:     Cream Base  (Saltstable LO) cream, SaltStable LS cream, Disp: , Rfl:     Cyanocobalamin 1000 MCG sublingual tablet, cyanocobalamin (vit B-12) 1,000 mcg sublingual tablet  Place 1 tablet every day by sublingual route., Disp: , Rfl:     cyclobenzaprine (FLEXERIL) 10 MG tablet, Take  by mouth 3 (Three) Times a Day As Needed., Disp: , Rfl:     Diclofenac Sodium (VOLTAREN) 1 % gel gel, diclofenac 1 % topical gel  APPLY 2 GRAMS TOPICALLY TO THE AFFECTED AREA(S) FOUR TIMES DAILY, Disp: , Rfl:     EPINEPHrine (EPIPEN) 0.3 MG/0.3ML solution auto-injector injection, epinephrine 0.3 mg/0.3 mL injection, auto-injector  USE AS DIRECTED FOR ACUTE ALLERGIC REACTION, Disp: , Rfl:     famotidine (PEPCID) 40 MG tablet, Take 1 tablet by mouth 2 (Two) Times a Day., Disp: 180 tablet, Rfl: 1    ferrous gluconate (FERGON) 324 MG tablet, Take 1 tablet by mouth once daily with breakfast, Disp: 30 tablet, Rfl: 0    fluocinonide (LIDEX) 0.05 % cream, Apply  topically to the appropriate area as directed Daily., Disp: , Rfl:     fluticasone (FLONASE) 50 MCG/ACT nasal spray, fluticasone propionate 50 mcg/actuation nasal spray,suspension  USE 2 SPRAY(S) IN EACH NOSTRIL ONCE DAILY, Disp: , Rfl:     folic acid (FOLVITE) 1 MG tablet, folic acid 1 mg tablet, Disp: , Rfl:     gabapentin (NEURONTIN) 300 MG capsule, Take 1 capsule by mouth Daily., Disp: , Rfl:     Glucagon (Gvoke HypoPen 2-Pack) 0.5 MG/0.1ML solution auto-injector, Inject by subcutaneous route for 30 days., Disp: , Rfl:     glucose blood test strip, Accu-Chek Mary Plus test strips, Disp: , Rfl:     hepatitis A (Havrix) 1440 EL U/ML vaccine, Havrix (PF) 1,440 DIXON unit/mL intramuscular syringe, Disp: , Rfl:     hydrocortisone 2.5 % lotion, hydrocortisone 2.5 % lotion  APPLY A THIN LAYER TO THE AFFECTED AREA(S) BY TOPICAL ROUTE ONCE DAILY, Disp: , Rfl:     influenza vac split quad (FLUZONE,FLUARIX,AFLURIA,FLULAVAL) 0.5 ML suspension prefilled syringe injection, Fluarix Quad 5111-3295 (PF) 60 mcg  "(15 mcg x 4)/0.5 mL IM syringe, Disp: , Rfl:     Insulin Aspart (NOVOLOG SC), Inject  under the skin into the appropriate area as directed. 32 units a.m.    30 units at lunch    40 units at hs, Disp: , Rfl:     Insulin Pen Needle (BD Pen Needle Gavi 2nd Gen) 32G X 4 MM misc, BD Gavi 2nd Gen Pen Needle 32 gauge x 5/32\"  USE 1 PEN NEEDLE 5-7 TIMES DAILY, Disp: , Rfl:     ketoconazole (NIZORAL) 2 % shampoo, ketoconazole 2 % shampoo  APPLY TOPICALLY TO SCALP 2 3 TIMES PER WEEK; APPLY TO DRY SCALP LET IT SIT FOR 15 20 MINUTES THEN RINSE, Disp: , Rfl:     lactulose (CHRONULAC) 10 GM/15ML solution, Take 30 mL by mouth 2 (Two) Times a Day for 90 days., Disp: 5400 mL, Rfl: 1    magnesium oxide (MAG-OX) 400 MG tablet, 1 tablet., Disp: , Rfl:     meloxicam (MOBIC) 15 MG tablet, meloxicam 15 mg tablet  TAKE 1 TABLET BY MOUTH ONCE DAILY, Disp: , Rfl:     methotrexate 2.5 MG tablet, methotrexate sodium 2.5 mg tablet, Disp: , Rfl:     mupirocin (BACTROBAN) 2 % ointment, mupirocin 2 % topical ointment, Disp: , Rfl:     ofloxacin (OCUFLOX) 0.3 % ophthalmic solution, , Disp: , Rfl:     polyethylene glycol (MIRALAX) 17 GM/SCOOP powder, polyethylene glycol 3350 17 gram/dose oral powder, Disp: , Rfl:     pramipexole (MIRAPEX) 0.125 MG tablet, pramipexole 0.125 mg tablet  TAKE 1 TABLET BY MOUTH TWICE DAILY, Disp: , Rfl:     prednisoLONE acetate (PRED FORTE) 1 % ophthalmic suspension, 1 gtt each eye once daily, Disp: , Rfl:     predniSONE (DELTASONE) 10 MG tablet, prednisone 10 mg tablet, Disp: , Rfl:     pregabalin (LYRICA) 25 MG capsule, Take 1 capsule by mouth 3 times a day., Disp: , Rfl:     promethazine (PHENERGAN) 25 MG tablet, promethazine 25 mg tablet, Disp: , Rfl:     promethazine-dextromethorphan (PROMETHAZINE-DM) 6.25-15 MG/5ML syrup, promethazine-DM 6.25 mg-15 mg/5 mL oral syrup, Disp: , Rfl:     Provigil 200 MG tablet, , Disp: , Rfl:     riFAXIMin (Xifaxan) 550 MG tablet, Take 1 tablet by mouth 2 (Two) Times a Day., Disp: 180 " "tablet, Rfl: 1    rivastigmine (EXELON) 4.5 MG capsule, rivastigmine 4.5 mg capsule, Disp: , Rfl:     Semaglutide (OZEMPIC, 2 MG/DOSE, SC), Inject  under the skin into the appropriate area as directed 1 (One) Time Per Week., Disp: , Rfl:     simethicone (MYLICON) 80 MG chewable tablet, simethicone 80 mg chewable tablet, Disp: , Rfl:     simvastatin (ZOCOR) 40 MG tablet, 1 tablet., Disp: , Rfl:     SITagliptin (JANUVIA) 100 MG tablet, Januvia 100 mg tablet  TAKE 1 TABLET BY MOUTH ONCE DAILY FOR DIABETES, Disp: , Rfl:     tadalafil (CIALIS) 5 MG tablet, 1 tablet., Disp: , Rfl:     Tdap (Adacel) 5-2-15.5 LF-MCG/0.5 injection, Adacel (Tdap Adolesn/Adult)(PF)2 Lf-(2.5-5-3-5)-5 Lf/0.5 mL IM syringe, Disp: , Rfl:     Tiotropium Bromide Monohydrate (Spiriva Respimat) 1.25 MCG/ACT aerosol solution inhaler, Inhale., Disp: , Rfl:     traMADol (ULTRAM) 50 MG tablet, , Disp: , Rfl:     Tresiba FlexTouch 200 UNIT/ML solution pen-injector pen injection, INJECT 50 UNITS SUBCUTANEOUSLY IN THE MORNING AND THEN INJECT 50 UNITS IN THE EVENING FOR DIABETES, Disp: , Rfl:     triamcinolone (KENALOG) 0.025 % cream, APPLY A THIN LAYER TO THE RASH ON THE FACE AND EARS TWICE DAILY AS NEEDED FOR FLARES ONLY, Disp: , Rfl:     ubrogepant (UBRELVY) 100 MG tablet, Take 1 tablet by mouth., Disp: , Rfl:      Allergies     Allergies   Allergen Reactions    Cat Hair Extract Itching and Other (See Comments)    Dust Mite Extract Itching and Other (See Comments)       Social History       Social History     Social History Narrative    Not on file         Objective       /86 (BP Location: Right arm, Patient Position: Sitting, Cuff Size: Adult)   Pulse 92   Ht 182.9 cm (72.01\")   Wt 118 kg (260 lb 3.2 oz)   BMI 35.28 kg/m²       Physical Exam    Results       Result Review :    The following data was reviewed by: JALEN Lerma on 05/30/2024:    CBC w/diff          1/25/2024    16:32   CBC w/Diff   WBC 6.0       RBC 5.0     "   Hemoglobin 14.9       Hematocrit 43.6       MCV 88.0       MCH 30.1       MCHC 34.3       RDW 13.8       Platelets 210          Details          This result is from an external source.               1/25/24 AFP-1.4    CMP: Creatinine-1.02, alk phos 40, AST 20, ALT 18, total bilirubin 0.6.  INR -1.0     11/29/2023 right upper quadrant ultrasound-no visualized ascites.  Coarsened echotexture and increased echogenicity of the liver is seen.  Liver measures 19 cm.  Mildly nodular contour noted.             Assessment and Plan              Diagnoses and all orders for this visit:    1. 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 Hepatic encephalopathy  -     riFAXIMin (Xifaxan) 550 MG tablet; Take 1 tablet by mouth 2 (Two) Times a Day.  Dispense: 180 tablet; Refill: 1  -     lactulose (CHRONULAC) 10 GM/15ML solution; Take 30 mL by mouth 2 (Two) Times a Day for 90 days.  Dispense: 5400 mL; Refill: 1    3. Gastroesophageal reflux disease, unspecified whether esophagitis present  -     famotidine (PEPCID) 40 MG tablet; Take 1 tablet by mouth 2 (Two) Times a Day.  Dispense: 180 tablet; Refill: 1        Cirrhosis:  Etiology - type II diabetes.    Ascites:  None on exam.    Varices:  None on EGD 11/2022.    Encephalopathy:  Admits intermittent confusion and insomnia.  He is now taking lactulose routinely and xifaxin.          Follow Up     Follow Up   Return in about 6 months (around 11/30/2024) for Cirrhosis.  Patient was given instructions and counseling regarding his condition or for health maintenance advice. Please see specific information pulled into the AVS if appropriate.

## 2024-06-04 RX ORDER — FERROUS GLUCONATE 324(38)MG
1 TABLET ORAL
Qty: 30 TABLET | Refills: 0 | Status: SHIPPED | OUTPATIENT
Start: 2024-06-04

## 2024-06-17 ENCOUNTER — TELEPHONE (OUTPATIENT)
Dept: GASTROENTEROLOGY | Facility: CLINIC | Age: 59
End: 2024-06-17
Payer: COMMERCIAL

## 2024-06-17 NOTE — TELEPHONE ENCOUNTER
----- Message from Josefina Rich sent at 6/17/2024 12:43 PM EDT -----  US c/w known cirrhosis.  Recommend repeat in 6 months for surveillance.

## 2024-07-02 ENCOUNTER — TELEPHONE (OUTPATIENT)
Dept: GASTROENTEROLOGY | Facility: CLINIC | Age: 59
End: 2024-07-02
Payer: COMMERCIAL

## 2024-07-02 NOTE — TELEPHONE ENCOUNTER
Patient called and left a voicemail stating that Dr yAad An was needing surgical clearance from GI for a orthpedic surgery that he is having on 7/31/2024.     Our office did not receive any requests or documentation of this. I called Dr An's office with [facility] and spoke with a nurse and she stated that she did not see anything regarding a clearance from GI and that she would leave a note for Dr An. I left our fax number for clearance if needed.

## 2024-07-15 ENCOUNTER — TELEPHONE (OUTPATIENT)
Dept: GASTROENTEROLOGY | Facility: CLINIC | Age: 59
End: 2024-07-15
Payer: COMMERCIAL

## 2024-07-16 ENCOUNTER — TELEPHONE (OUTPATIENT)
Dept: GASTROENTEROLOGY | Facility: CLINIC | Age: 59
End: 2024-07-16
Payer: COMMERCIAL

## 2024-07-16 NOTE — TELEPHONE ENCOUNTER
Received a surgical clearance request From [facility], ryan Brown pt must complete outstanding labs before she can clear him, attempted to contact pt and advise this.phone rang several times then stated the call could not be completed. Will try again later.

## 2024-07-17 ENCOUNTER — LAB (OUTPATIENT)
Dept: LAB | Facility: HOSPITAL | Age: 59
End: 2024-07-17
Payer: COMMERCIAL

## 2024-07-17 DIAGNOSIS — K75.81 LIVER CIRRHOSIS SECONDARY TO NASH: ICD-10-CM

## 2024-07-17 DIAGNOSIS — K74.60 LIVER CIRRHOSIS SECONDARY TO NASH: ICD-10-CM

## 2024-07-17 LAB
ALBUMIN SERPL-MCNC: 4.2 G/DL (ref 3.5–5.2)
ALBUMIN/GLOB SERPL: 1.5 G/DL
ALP SERPL-CCNC: 54 U/L (ref 39–117)
ALT SERPL W P-5'-P-CCNC: 13 U/L (ref 1–41)
ANION GAP SERPL CALCULATED.3IONS-SCNC: 9 MMOL/L (ref 5–15)
AST SERPL-CCNC: 17 U/L (ref 1–40)
BASOPHILS # BLD AUTO: 0.03 10*3/MM3 (ref 0–0.2)
BASOPHILS NFR BLD AUTO: 0.4 % (ref 0–1.5)
BILIRUB SERPL-MCNC: 0.6 MG/DL (ref 0–1.2)
BUN SERPL-MCNC: 14 MG/DL (ref 8–23)
BUN/CREAT SERPL: 12.6 (ref 7–25)
CALCIUM SPEC-SCNC: 9.4 MG/DL (ref 8.6–10.5)
CHLORIDE SERPL-SCNC: 102 MMOL/L (ref 98–107)
CO2 SERPL-SCNC: 25 MMOL/L (ref 22–29)
CREAT SERPL-MCNC: 1.11 MG/DL (ref 0.76–1.27)
DEPRECATED RDW RBC AUTO: 42.4 FL (ref 37–54)
EGFRCR SERPLBLD CKD-EPI 2021: 76 ML/MIN/1.73
EOSINOPHIL # BLD AUTO: 0.08 10*3/MM3 (ref 0–0.4)
EOSINOPHIL NFR BLD AUTO: 1.2 % (ref 0.3–6.2)
ERYTHROCYTE [DISTWIDTH] IN BLOOD BY AUTOMATED COUNT: 12.9 % (ref 12.3–15.4)
GLOBULIN UR ELPH-MCNC: 2.8 GM/DL
GLUCOSE SERPL-MCNC: 299 MG/DL (ref 65–99)
HCT VFR BLD AUTO: 45.7 % (ref 37.5–51)
HGB BLD-MCNC: 15 G/DL (ref 13–17.7)
IMM GRANULOCYTES # BLD AUTO: 0.02 10*3/MM3 (ref 0–0.05)
IMM GRANULOCYTES NFR BLD AUTO: 0.3 % (ref 0–0.5)
INR PPP: 0.99 (ref 0.86–1.15)
LYMPHOCYTES # BLD AUTO: 2.19 10*3/MM3 (ref 0.7–3.1)
LYMPHOCYTES NFR BLD AUTO: 32.8 % (ref 19.6–45.3)
MCH RBC QN AUTO: 29.2 PG (ref 26.6–33)
MCHC RBC AUTO-ENTMCNC: 32.8 G/DL (ref 31.5–35.7)
MCV RBC AUTO: 89.1 FL (ref 79–97)
MONOCYTES # BLD AUTO: 0.48 10*3/MM3 (ref 0.1–0.9)
MONOCYTES NFR BLD AUTO: 7.2 % (ref 5–12)
NEUTROPHILS NFR BLD AUTO: 3.88 10*3/MM3 (ref 1.7–7)
NEUTROPHILS NFR BLD AUTO: 58.1 % (ref 42.7–76)
PLATELET # BLD AUTO: 199 10*3/MM3 (ref 140–450)
PMV BLD AUTO: 9.3 FL (ref 6–12)
POTASSIUM SERPL-SCNC: 4.2 MMOL/L (ref 3.5–5.2)
PROT SERPL-MCNC: 7 G/DL (ref 6–8.5)
PROTHROMBIN TIME: 13.3 SECONDS (ref 11.8–14.9)
RBC # BLD AUTO: 5.13 10*6/MM3 (ref 4.14–5.8)
SODIUM SERPL-SCNC: 136 MMOL/L (ref 136–145)
WBC NRBC COR # BLD AUTO: 6.68 10*3/MM3 (ref 3.4–10.8)

## 2024-07-17 PROCEDURE — 85025 COMPLETE CBC W/AUTO DIFF WBC: CPT

## 2024-07-17 PROCEDURE — 80053 COMPREHEN METABOLIC PANEL: CPT

## 2024-07-17 PROCEDURE — 85610 PROTHROMBIN TIME: CPT

## 2024-09-23 RX ORDER — FERROUS GLUCONATE 324(38)MG
1 TABLET ORAL
Qty: 30 TABLET | Refills: 0 | OUTPATIENT
Start: 2024-09-23

## 2024-12-05 ENCOUNTER — OFFICE VISIT (OUTPATIENT)
Dept: GASTROENTEROLOGY | Facility: CLINIC | Age: 59
End: 2024-12-05
Payer: COMMERCIAL

## 2024-12-05 DIAGNOSIS — K74.60 LIVER CIRRHOSIS SECONDARY TO NASH: Primary | ICD-10-CM

## 2024-12-05 DIAGNOSIS — K76.82 HEPATIC ENCEPHALOPATHY: ICD-10-CM

## 2024-12-05 DIAGNOSIS — K21.9 GASTROESOPHAGEAL REFLUX DISEASE, UNSPECIFIED WHETHER ESOPHAGITIS PRESENT: ICD-10-CM

## 2024-12-05 DIAGNOSIS — K75.81 LIVER CIRRHOSIS SECONDARY TO NASH: Primary | ICD-10-CM

## 2024-12-05 RX ORDER — FAMOTIDINE 40 MG/1
40 TABLET, FILM COATED ORAL 2 TIMES DAILY
Qty: 180 TABLET | Refills: 1 | Status: SHIPPED | OUTPATIENT
Start: 2024-12-05

## 2024-12-05 RX ORDER — OMEPRAZOLE 40 MG/1
CAPSULE, DELAYED RELEASE ORAL
COMMUNITY
End: 2024-12-05 | Stop reason: SDUPTHER

## 2024-12-05 RX ORDER — LORATADINE 10 MG
TABLET ORAL
COMMUNITY
Start: 2024-10-30

## 2024-12-05 RX ORDER — LACTULOSE 10 G/15ML
15-30 SOLUTION ORAL 2 TIMES DAILY
Qty: 900 ML | Refills: 5 | Status: SHIPPED | OUTPATIENT
Start: 2024-12-05

## 2024-12-05 RX ORDER — AMANTADINE HYDROCHLORIDE 100 MG/1
TABLET ORAL
COMMUNITY

## 2024-12-05 RX ORDER — MONTELUKAST SODIUM 10 MG/1
1 TABLET ORAL
COMMUNITY

## 2024-12-05 RX ORDER — ERYTHROMYCIN 5 MG/G
OINTMENT OPHTHALMIC
COMMUNITY

## 2024-12-05 RX ORDER — INSULIN LISPRO 200 [IU]/ML
INJECTION, SOLUTION SUBCUTANEOUS
COMMUNITY

## 2024-12-05 RX ORDER — OMEPRAZOLE 40 MG/1
40 CAPSULE, DELAYED RELEASE ORAL DAILY
Qty: 90 CAPSULE | Refills: 1 | Status: SHIPPED | OUTPATIENT
Start: 2024-12-05

## 2024-12-05 RX ORDER — GENTAMICIN SULFATE 3 MG/ML
SOLUTION/ DROPS OPHTHALMIC
COMMUNITY

## 2024-12-05 RX ORDER — LIDOCAINE 50 MG/G
OINTMENT TOPICAL
COMMUNITY

## 2024-12-05 RX ORDER — PANTOPRAZOLE SODIUM 40 MG/1
1 TABLET, DELAYED RELEASE ORAL DAILY
COMMUNITY
End: 2024-12-05

## 2024-12-05 NOTE — PROGRESS NOTES
Chief Complaint     Cirrhosis    History of Present Illness     Edgardo Grayson is a 59 y.o. male who presents to [facility] GASTROENTEROLOGY for follow-up of cirrhosis.     He reports that he's doing well.  Denies abdominal pain and swelling. Reports compliance with xifaxin and lactulose.           History      Past Medical History:   Diagnosis Date    Anxiety     Chronic headaches     Cirrhosis     Depression     Diabetes mellitus     Hyperlipidemia     Hypertension     Liver disease     Cirrhosis     Migraine     Muscle pain     Parkinson disease     Sleep apnea     Viral hepatitis A without hepatic coma      Past Surgical History:   Procedure Laterality Date    ANAL FISSURECTOMY      CARPAL TUNNEL RELEASE      CHOLECYSTECTOMY      COLONOSCOPY      COLONOSCOPY N/A 11/29/2022    Procedure: COLONOSCOPY WITH BIOPSIES;  Surgeon: Patsy Carrillo MD;  Location: [facility] ENDOSCOPY;  Service: Gastroenterology;  Laterality: N/A;  NORMAL COLONOSCOPY    ENDOSCOPY N/A 11/29/2022    Procedure: ESOPHAGOGASTRODUODENOSCOPY WITH BIOPSY;  Surgeon: Patsy Carrillo MD;  Location: [facility] ENDOSCOPY;  Service: Gastroenterology;  Laterality: N/A;  HIATAL HERNIA    HIP ARTHROPLASTY      X2 Left     LIVER BIOPSY      POSTERIOR LUMBAR/THORACIC SPINE FUSION      ROTATOR CUFF REPAIR      TONSILLECTOMY      UPPER GASTROINTESTINAL ENDOSCOPY       Family History   Problem Relation Age of Onset    Malig Hyperthermia Neg Hx         Current Medications       Current Outpatient Medications:     albuterol sulfate  (90 Base) MCG/ACT inhaler, albuterol sulfate HFA 90 mcg/actuation aerosol inhaler  INHALE 2 PUFFS BY MOUTH EVERY 4 TO 6 HOURS AS NEEDED FOR COUGH WHEEZE OR SHORTNESS OF BREATH USE WITH VORTEX SPACER, Disp: , Rfl:     Alcohol Swabs ([employer] Prep) 70 % pads, USE WITH TREMFYA INJECTION, Disp: , Rfl:     amantadine (SYMMETREL) 100 MG tablet, i po bid, Disp: , Rfl:     Apremilast (Otezla) 30 MG tablet, Otezla 30  mg tablet  TAKE 2 TABLETS BY MOUTH ONCE DAILY, Disp: , Rfl:     aspirin 81 MG EC tablet, Aspir-81 mg tablet,delayed release  Take 1 tablet every day by oral route., Disp: , Rfl:     BD Pen Needle Gavi 2nd Gen 32G X 4 MM misc, USE 5 TO 7 TIMES A DAY, Disp: , Rfl:     benazepril (LOTENSIN) 10 MG tablet, benazepril 10 mg tablet  TAKE 1 TABLET BY MOUTH ONCE DAILY, Disp: , Rfl:     budesonide-formoterol (SYMBICORT) 160-4.5 MCG/ACT inhaler, Symbicort 160 mcg-4.5 mcg/actuation HFA aerosol inhaler  INHALE 2 PUFFS BY MOUTH EVERY 12 HOURS. USE WITH SPACER. RINSE MOUTH AFTER EACH USE, Disp: , Rfl:     carbidopa-levodopa (SINEMET)  MG per tablet, carbidopa 25 mg-levodopa 100 mg tablet  TAKE 1 TABLET BY MOUTH EVERY DAY AT BEDTIME, Disp: , Rfl:     cetirizine (zyrTEC) 10 MG tablet, cetirizine 10 mg tablet  TAKE 1 TABLET BY MOUTH ONCE DAILY, Disp: , Rfl:     cholecalciferol (VITAMIN D3) 25 MCG (1000 UT) tablet, Take 1 tablet by mouth Daily., Disp: , Rfl:     citalopram (CeleXA) 20 MG tablet, Take 1 tablet by mouth Daily., Disp: , Rfl:     clobetasol (TEMOVATE) 0.05 % external solution, , Disp: , Rfl:     Continuous Blood Gluc Sensor (Dexcom G6 Sensor), , Disp: , Rfl:     Continuous Blood Gluc Transmit (Dexcom G6 Transmitter) misc, See Admin Instructions., Disp: , Rfl:     Cream Base (Saltstable LO) cream, SaltStable LS cream, Disp: , Rfl:     Cyanocobalamin 1000 MCG sublingual tablet, cyanocobalamin (vit B-12) 1,000 mcg sublingual tablet  Place 1 tablet every day by sublingual route., Disp: , Rfl:     Diclofenac Sodium (VOLTAREN) 1 % gel gel, diclofenac 1 % topical gel  APPLY 2 GRAMS TOPICALLY TO THE AFFECTED AREA(S) FOUR TIMES DAILY, Disp: , Rfl:     EPINEPHrine (EPIPEN) 0.3 MG/0.3ML solution auto-injector injection, epinephrine 0.3 mg/0.3 mL injection, auto-injector  USE AS DIRECTED FOR ACUTE ALLERGIC REACTION, Disp: , Rfl:     erythromycin (ROMYCIN) 5 MG/GM ophthalmic ointment, APPLY ONE RIBBON BY OPTHALMIC ROUTE THREE  "TIMES DAILY TO THE AFFECTED EYE(S), Disp: , Rfl:     famotidine (PEPCID) 40 MG tablet, Take 1 tablet by mouth 2 (Two) Times a Day., Disp: 180 tablet, Rfl: 1    fluocinonide (LIDEX) 0.05 % cream, Apply  topically to the appropriate area as directed Daily., Disp: , Rfl:     fluticasone (FLONASE) 50 MCG/ACT nasal spray, fluticasone propionate 50 mcg/actuation nasal spray,suspension  USE 2 SPRAY(S) IN EACH NOSTRIL ONCE DAILY, Disp: , Rfl:     gabapentin (NEURONTIN) 300 MG capsule, Take 1 capsule by mouth Daily., Disp: , Rfl:     gentamicin (GARAMYCIN) 0.3 % ophthalmic solution, , Disp: , Rfl:     glucose blood test strip, Accu-Chek Mary Plus test strips, Disp: , Rfl:     hepatitis A (Havrix) 1440 EL U/ML vaccine, Havrix (PF) 1,440 DIXON unit/mL intramuscular syringe, Disp: , Rfl:     hydrocortisone 2.5 % lotion, hydrocortisone 2.5 % lotion  APPLY A THIN LAYER TO THE AFFECTED AREA(S) BY TOPICAL ROUTE ONCE DAILY, Disp: , Rfl:     influenza vac split quad (FLUZONE,FLUARIX,AFLURIA,FLULAVAL) 0.5 ML suspension prefilled syringe injection, Fluarix Quad 1048-3876 (PF) 60 mcg (15 mcg x 4)/0.5 mL IM syringe, Disp: , Rfl:     Insulin Aspart (NOVOLOG SC), Inject  under the skin into the appropriate area as directed. 32 units a.m.    30 units at lunch    40 units at hs, Disp: , Rfl:     Insulin Lispro (HumaLOG KwikPen) 200 UNIT/ML solution pen-injector, , Disp: , Rfl:     Insulin Pen Needle (BD Pen Needle Gavi 2nd Gen) 32G X 4 MM misc, BD Gavi 2nd Gen Pen Needle 32 gauge x 5/32\"  USE 1 PEN NEEDLE 5-7 TIMES DAILY, Disp: , Rfl:     ketoconazole (NIZORAL) 2 % shampoo, ketoconazole 2 % shampoo  APPLY TOPICALLY TO SCALP 2 3 TIMES PER WEEK; APPLY TO DRY SCALP LET IT SIT FOR 15 20 MINUTES THEN RINSE, Disp: , Rfl:     lidocaine (XYLOCAINE) 5 % ointment, APPLY TOPICALLY TO AFFECTED AREA(S) 1 4 TIMES DAILY AS NEEDED, Disp: , Rfl:     magnesium oxide (MAG-OX) 400 MG tablet, 1 tablet., Disp: , Rfl:     meloxicam (MOBIC) 15 MG tablet, meloxicam " 15 mg tablet  TAKE 1 TABLET BY MOUTH ONCE DAILY, Disp: , Rfl:     methotrexate 2.5 MG tablet, methotrexate sodium 2.5 mg tablet, Disp: , Rfl:     montelukast (SINGULAIR) 10 MG tablet, Take 1 tablet by mouth every night at bedtime., Disp: , Rfl:     mupirocin (BACTROBAN) 2 % ointment, mupirocin 2 % topical ointment, Disp: , Rfl:     ofloxacin (OCUFLOX) 0.3 % ophthalmic solution, , Disp: , Rfl:     omeprazole (priLOSEC) 40 MG capsule, Take 1 capsule by mouth Daily., Disp: 90 capsule, Rfl: 1    pramipexole (MIRAPEX) 0.125 MG tablet, pramipexole 0.125 mg tablet  TAKE 1 TABLET BY MOUTH TWICE DAILY, Disp: , Rfl:     prednisoLONE acetate (PRED FORTE) 1 % ophthalmic suspension, 1 gtt each eye once daily, Disp: , Rfl:     promethazine (PHENERGAN) 25 MG tablet, promethazine 25 mg tablet, Disp: , Rfl:     promethazine-dextromethorphan (PROMETHAZINE-DM) 6.25-15 MG/5ML syrup, promethazine-DM 6.25 mg-15 mg/5 mL oral syrup, Disp: , Rfl:     Provigil 200 MG tablet, , Disp: , Rfl:     riFAXIMin (Xifaxan) 550 MG tablet, Take 1 tablet by mouth 2 (Two) Times a Day., Disp: 180 tablet, Rfl: 1    rivastigmine (EXELON) 4.5 MG capsule, rivastigmine 4.5 mg capsule, Disp: , Rfl:     Semaglutide (OZEMPIC, 2 MG/DOSE, SC), Inject  under the skin into the appropriate area as directed 1 (One) Time Per Week., Disp: , Rfl:     simethicone (MYLICON) 80 MG chewable tablet, simethicone 80 mg chewable tablet, Disp: , Rfl:     simvastatin (ZOCOR) 40 MG tablet, 1 tablet., Disp: , Rfl:     SITagliptin (JANUVIA) 100 MG tablet, Januvia 100 mg tablet  TAKE 1 TABLET BY MOUTH ONCE DAILY FOR DIABETES, Disp: , Rfl:     Tdap (Adacel) 5-2-15.5 LF-MCG/0.5 injection, Adacel (Tdap Adolesn/Adult)(PF)2 Lf-(2.5-5-3-5)-5 Lf/0.5 mL IM syringe, Disp: , Rfl:     Tiotropium Bromide Monohydrate (Spiriva Respimat) 1.25 MCG/ACT aerosol solution inhaler, Inhale., Disp: , Rfl:     traMADol (ULTRAM) 50 MG tablet, , Disp: , Rfl:     Tremfya 100 MG/ML solution pen-injector, INJECT 1  PEN SUBCUTANEOUSLY ONCE THEN IN 4 WEEKS THEN EVERY 8 WEEKS [L40.0], Disp: , Rfl:     Tresiba FlexTouch 200 UNIT/ML solution pen-injector pen injection, INJECT 50 UNITS SUBCUTANEOUSLY IN THE MORNING AND THEN INJECT 50 UNITS IN THE EVENING FOR DIABETES, Disp: , Rfl:     triamcinolone (KENALOG) 0.025 % cream, APPLY A THIN LAYER TO THE RASH ON THE FACE AND EARS TWICE DAILY AS NEEDED FOR FLARES ONLY, Disp: , Rfl:     ubrogepant (UBRELVY) 100 MG tablet, Take 1 tablet by mouth., Disp: , Rfl:     lactulose (CHRONULAC) 10 GM/15ML solution, Take 15-30 mL by mouth 2 (Two) Times a Day. Goal of 2-3 bowel movements per day., Disp: 900 mL, Rfl: 5     Allergies     Allergies   Allergen Reactions    Cat Hair Extract Itching and Other (See Comments)    Dust Mite Extract Itching and Other (See Comments)       Social History       Social History     Social History Narrative    Not on file         Objective       There were no vitals taken for this visit.      Physical Exam  Constitutional:       General: He is not in acute distress.     Appearance: Normal appearance. He is well-developed and normal weight.   HENT:      Head: Normocephalic and atraumatic.   Eyes:      Conjunctiva/sclera: Conjunctivae normal.      Pupils: Pupils are equal, round, and reactive to light.      Visual Fields: Right eye visual fields normal and left eye visual fields normal.   Cardiovascular:      Rate and Rhythm: Normal rate.   Pulmonary:      Effort: Pulmonary effort is normal. No respiratory distress or retractions.      Breath sounds: Normal air entry.   Abdominal:      General: There is no distension.      Palpations: Abdomen is soft.      Tenderness: There is no abdominal tenderness.   Musculoskeletal:         General: Normal range of motion.      Right lower leg: No edema.      Left lower leg: No edema.   Skin:     General: Skin is warm and dry.      Findings: No lesion.   Neurological:      General: No focal deficit present.      Mental Status: He is  alert and oriented to person, place, and time.   Psychiatric:         Mood and Affect: Mood and affect normal.         Behavior: Behavior normal.         Results       Result Review :    The following data was reviewed by: JALEN Lerma on 12/05/2024:    CBC w/diff          1/25/2024    16:32 7/17/2024    14:52   CBC w/Diff   WBC 6.0     6.68    RBC 5.0     5.13    Hemoglobin 14.9     15.0    Hematocrit 43.6     45.7    MCV 88.0     89.1    MCH 30.1     29.2    MCHC 34.3     32.8    RDW 13.8     12.9    Platelets 210     199    Neutrophil Rel %  58.1    Immature Granulocyte Rel %  0.3    Lymphocyte Rel %  32.8    Monocyte Rel %  7.2    Eosinophil Rel %  1.2    Basophil Rel %  0.4       Details          This result is from an external source.             CMP          7/17/2024    14:52   CMP   Glucose 299    BUN 14    Creatinine 1.11    EGFR 76.0    Sodium 136    Potassium 4.2    Chloride 102    Calcium 9.4    Total Protein 7.0    Albumin 4.2    Globulin 2.8    Total Bilirubin 0.6    Alkaline Phosphatase 54    AST (SGOT) 17    ALT (SGPT) 13    Albumin/Globulin Ratio 1.5    BUN/Creatinine Ratio 12.6    Anion Gap 9.0        PT/INR   Protime   Date Value Ref Range Status   07/17/2024 13.3 11.8 - 14.9 Seconds Final   03/05/2019 11.8 10.3 - 13.3 s Final     Comment:     (note)  Anticoagulants may alter the results of Laboratory   Coagulation assays. New-generation anticoagulants such as   direct Thrombin inhibitors (Dabigatran/Pradaxa, Argatroban,   Bivalrudin) and direct/indirect factor Xa inhibitors   (Rivaroxaban/Xarelto,Apixaban/Eliquis, Danaparoid/Orgaran,   Fondaparinux/Arixtra) have been shown to affect the results   of Laboratory Coagulation assays, creating falsely elevated   or decreased values. Correlation with medication history is   advised.     INR   Date Value Ref Range Status   07/17/2024 0.99 0.86 - 1.15 Final   01/25/2024 1.0 0.9 - 1.1 Final     Comment:     Moderate-intensity Warfarin  Therapy     2.0-3.0  Higher-intensity Warfarin Therapy         3.0-4.0            Assessment and Plan              Diagnoses and all orders for this visit:    1. 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 Hepatic encephalopathy  -     riFAXIMin (Xifaxan) 550 MG tablet; Take 1 tablet by mouth 2 (Two) Times a Day.  Dispense: 180 tablet; Refill: 1  -     lactulose (CHRONULAC) 10 GM/15ML solution; Take 15-30 mL by mouth 2 (Two) Times a Day. Goal of 2-3 bowel movements per day.  Dispense: 900 mL; Refill: 5    3. Gastroesophageal reflux disease, unspecified whether esophagitis present  -     famotidine (PEPCID) 40 MG tablet; Take 1 tablet by mouth 2 (Two) Times a Day.  Dispense: 180 tablet; Refill: 1  -     omeprazole (priLOSEC) 40 MG capsule; Take 1 capsule by mouth Daily.  Dispense: 90 capsule; Refill: 1      Cirrhosis:  Etiology - type II diabetes.  Cirrhosis noted during cholecystectomy and biopsy obtained at [place].  He had a fibroscan at [facility] and was told that it was normal.    Ascites:  None on exam.    Varices:  None on EGD 11/2022.    Encephalopathy:  Admits intermittent confusion and insomnia.  He is now taking lactulose routinely and xifaxin.            Follow Up     Follow Up   Return in about 6 months (around 6/5/2025) for Cirrhosis.  Patient was given instructions and counseling regarding his condition or for health maintenance advice. Please see specific information pulled into the AVS if appropriate.

## 2024-12-06 ENCOUNTER — TELEPHONE (OUTPATIENT)
Dept: GASTROENTEROLOGY | Facility: CLINIC | Age: 59
End: 2024-12-06
Payer: COMMERCIAL

## 2024-12-06 NOTE — TELEPHONE ENCOUNTER
Contacted patient and left a detailed message I called [facility] about the ultrasound that Stephanie Rich has ordered they were able to scheduled this 12/09/2024 at 0900 am for test time of 930 am. NPO after midnight.  If this date and time does not work please call [phone] and reschedule this for a better date and time with your schedule.   
no diarrhea

## 2024-12-10 ENCOUNTER — TELEPHONE (OUTPATIENT)
Dept: GASTROENTEROLOGY | Facility: CLINIC | Age: 59
End: 2024-12-10
Payer: COMMERCIAL

## 2024-12-10 NOTE — TELEPHONE ENCOUNTER
----- Message from Josefina Rich sent at 12/10/2024 12:40 PM EST -----  Liver US is stable from previous.  Recommend to repeat in 6 months.

## 2025-01-07 ENCOUNTER — TELEPHONE (OUTPATIENT)
Dept: GASTROENTEROLOGY | Facility: CLINIC | Age: 60
End: 2025-01-07
Payer: COMMERCIAL

## 2025-01-07 NOTE — TELEPHONE ENCOUNTER
Patient called back ans was notified of his overdue lab orders. Patient stated that he will call [facility] and see if they ran the CMP or not and if they didn't he will go and get it done.

## 2025-01-28 DIAGNOSIS — K75.81 LIVER CIRRHOSIS SECONDARY TO NASH: ICD-10-CM

## 2025-01-28 DIAGNOSIS — K74.60 LIVER CIRRHOSIS SECONDARY TO NASH: ICD-10-CM

## 2025-02-16 DIAGNOSIS — K21.9 GASTROESOPHAGEAL REFLUX DISEASE, UNSPECIFIED WHETHER ESOPHAGITIS PRESENT: ICD-10-CM

## 2025-02-17 RX ORDER — PANTOPRAZOLE SODIUM 40 MG/1
40 TABLET, DELAYED RELEASE ORAL DAILY
Qty: 90 TABLET | Refills: 0 | OUTPATIENT
Start: 2025-02-17

## 2025-02-17 NOTE — TELEPHONE ENCOUNTER
Pt is requesting a refill of Pantoprazole 40 Mg     Last refill:  2/26/2024   Last ov: 12/5/2024  Next ov: 6/26/25

## 2025-03-11 DIAGNOSIS — K21.9 GASTROESOPHAGEAL REFLUX DISEASE, UNSPECIFIED WHETHER ESOPHAGITIS PRESENT: ICD-10-CM

## 2025-03-11 NOTE — TELEPHONE ENCOUNTER
Pt is requesting a refill of Pantoprazole 40 Mg cap    Last refill: 2/26/2024   Last ov: 12/5/2024   Next ov: 6/26/25

## 2025-04-02 NOTE — TELEPHONE ENCOUNTER
Received another refill request for pantoprazole.   Attempted to contact pt to clarify what medication he is on. Received busy signal.

## 2025-04-13 RX ORDER — PANTOPRAZOLE SODIUM 40 MG/1
40 TABLET, DELAYED RELEASE ORAL DAILY
Qty: 90 TABLET | Refills: 0 | Status: SHIPPED | OUTPATIENT
Start: 2025-04-13

## 2025-06-26 ENCOUNTER — OFFICE VISIT (OUTPATIENT)
Dept: GASTROENTEROLOGY | Facility: CLINIC | Age: 60
End: 2025-06-26
Payer: COMMERCIAL

## 2025-06-26 VITALS
WEIGHT: 247.2 LBS | HEART RATE: 88 BPM | SYSTOLIC BLOOD PRESSURE: 131 MMHG | DIASTOLIC BLOOD PRESSURE: 88 MMHG | HEIGHT: 72 IN | BODY MASS INDEX: 33.48 KG/M2

## 2025-06-26 DIAGNOSIS — K74.60 LIVER CIRRHOSIS SECONDARY TO NASH: Primary | ICD-10-CM

## 2025-06-26 DIAGNOSIS — K75.81 LIVER CIRRHOSIS SECONDARY TO NASH: Primary | ICD-10-CM

## 2025-06-26 DIAGNOSIS — K21.9 GASTROESOPHAGEAL REFLUX DISEASE, UNSPECIFIED WHETHER ESOPHAGITIS PRESENT: ICD-10-CM

## 2025-06-26 DIAGNOSIS — K76.82 HEPATIC ENCEPHALOPATHY: ICD-10-CM

## 2025-06-26 RX ORDER — MAGNESIUM GLUCONATE 27 MG(500)
1 TABLET ORAL
COMMUNITY

## 2025-06-26 RX ORDER — PANTOPRAZOLE SODIUM 40 MG/1
40 TABLET, DELAYED RELEASE ORAL DAILY
Qty: 90 TABLET | Refills: 3 | Status: SHIPPED | OUTPATIENT
Start: 2025-06-26

## 2025-06-26 RX ORDER — DULAGLUTIDE 0.75 MG/.5ML
INJECTION, SOLUTION SUBCUTANEOUS
COMMUNITY
Start: 2025-03-19

## 2025-06-26 RX ORDER — TIRZEPATIDE 2.5 MG/.5ML
INJECTION, SOLUTION SUBCUTANEOUS
COMMUNITY

## 2025-06-26 RX ORDER — TRAZODONE HYDROCHLORIDE 50 MG/1
50-100 TABLET ORAL NIGHTLY PRN
COMMUNITY
Start: 2025-04-17

## 2025-06-26 RX ORDER — RIFAMPIN 300 MG/1
CAPSULE ORAL
COMMUNITY
Start: 2025-05-02

## 2025-06-26 RX ORDER — EVOLOCUMAB 140 MG/ML
INJECTION, SOLUTION SUBCUTANEOUS
COMMUNITY

## 2025-06-26 RX ORDER — FAMOTIDINE 40 MG/1
40 TABLET, FILM COATED ORAL 2 TIMES DAILY
Qty: 180 TABLET | Refills: 3 | Status: SHIPPED | OUTPATIENT
Start: 2025-06-26

## 2025-06-26 NOTE — PROGRESS NOTES
Chief Complaint     Cirrhosis    Patient or patient representative verbalized consent for the use of Ambient Listening during the visit with  JALEN Lerma for chart documentation. 6/26/2025  15:11 EDT      History of Present Illness     History of Present Illness  The patient presents for follow-up of MEHTA cirrhosis, hepatic encephalopathy, and GERD.    He reports a general sense of well-being, although he experiences fatigue. He has been under the care of Dr. Yanes since 01/2025, with a scheduled follow-up in 6 months. His last laboratory tests were conducted last week, and he was informed that his liver enzymes were within normal limits.    He is currently on a regimen of Xifaxan and lactulose, the latter of which he takes intermittently due to associated diarrhea. He also reports an increase in confusion but does not believe it is related to his lactulose intake.    He is on pantoprazole for reflux management, which he reports as being mostly effective. However, he occasionally experiences episodes of vomiting during sleep. He also takes Pepcid at night and believes he requires 2 prescriptions of it.         History      Past Medical History:   Diagnosis Date    Anxiety     Chronic headaches     Cirrhosis     Depression     Diabetes mellitus     Hyperlipidemia     Hypertension     Liver disease     Cirrhosis     Migraine     Muscle pain     Parkinson disease     Sleep apnea     Viral hepatitis A without hepatic coma      Past Surgical History:   Procedure Laterality Date    ANAL FISSURECTOMY      CARPAL TUNNEL RELEASE      CHOLECYSTECTOMY      COLONOSCOPY      COLONOSCOPY N/A 11/29/2022    Procedure: COLONOSCOPY WITH BIOPSIES;  Surgeon: Patsy Carrillo MD;  Location: [facility] ENDOSCOPY;  Service: Gastroenterology;  Laterality: N/A;  NORMAL COLONOSCOPY    ENDOSCOPY N/A 11/29/2022    Procedure: ESOPHAGOGASTRODUODENOSCOPY WITH BIOPSY;  Surgeon: Patsy Carrillo MD;  Location: [facility]  ENDOSCOPY;  Service: Gastroenterology;  Laterality: N/A;  HIATAL HERNIA    HIP ARTHROPLASTY      X2 Left     LIVER BIOPSY      POSTERIOR LUMBAR/THORACIC SPINE FUSION      ROTATOR CUFF REPAIR      TONSILLECTOMY      UPPER GASTROINTESTINAL ENDOSCOPY       Family History   Problem Relation Age of Onset    Malig Hyperthermia Neg Hx         Current Medications       Current Outpatient Medications:     albuterol sulfate  (90 Base) MCG/ACT inhaler, albuterol sulfate HFA 90 mcg/actuation aerosol inhaler  INHALE 2 PUFFS BY MOUTH EVERY 4 TO 6 HOURS AS NEEDED FOR COUGH WHEEZE OR SHORTNESS OF BREATH USE WITH VORTEX SPACER, Disp: , Rfl:     Alcohol Swabs ([employer] Prep) 70 % pads, USE WITH TREMFYA INJECTION, Disp: , Rfl:     amantadine (SYMMETREL) 100 MG tablet, i po bid, Disp: , Rfl:     Apremilast (Otezla) 30 MG tablet, Otezla 30 mg tablet  TAKE 2 TABLETS BY MOUTH ONCE DAILY, Disp: , Rfl:     aspirin 81 MG EC tablet, Aspir-81 mg tablet,delayed release  Take 1 tablet every day by oral route., Disp: , Rfl:     BD Pen Needle Gavi 2nd Gen 32G X 4 MM misc, USE 5 TO 7 TIMES A DAY, Disp: , Rfl:     benazepril (LOTENSIN) 10 MG tablet, benazepril 10 mg tablet  TAKE 1 TABLET BY MOUTH ONCE DAILY, Disp: , Rfl:     budesonide-formoterol (SYMBICORT) 160-4.5 MCG/ACT inhaler, Symbicort 160 mcg-4.5 mcg/actuation HFA aerosol inhaler  INHALE 2 PUFFS BY MOUTH EVERY 12 HOURS. USE WITH SPACER. RINSE MOUTH AFTER EACH USE, Disp: , Rfl:     carbidopa-levodopa (SINEMET)  MG per tablet, carbidopa 25 mg-levodopa 100 mg tablet  TAKE 1 TABLET BY MOUTH EVERY DAY AT BEDTIME, Disp: , Rfl:     cetirizine (zyrTEC) 10 MG tablet, cetirizine 10 mg tablet  TAKE 1 TABLET BY MOUTH ONCE DAILY, Disp: , Rfl:     cholecalciferol (VITAMIN D3) 25 MCG (1000 UT) tablet, Take 1 tablet by mouth Daily., Disp: , Rfl:     citalopram (CeleXA) 20 MG tablet, Take 1 tablet by mouth Daily., Disp: , Rfl:     clobetasol (TEMOVATE) 0.05 % external solution, , Disp: , Rfl:      Continuous Blood Gluc Sensor (Dexcom G6 Sensor), , Disp: , Rfl:     Continuous Blood Gluc Transmit (Dexcom G6 Transmitter) misc, See Admin Instructions., Disp: , Rfl:     Cream Base (Saltstable LO) cream, SaltStable LS cream, Disp: , Rfl:     Cyanocobalamin 1000 MCG sublingual tablet, cyanocobalamin (vit B-12) 1,000 mcg sublingual tablet  Place 1 tablet every day by sublingual route., Disp: , Rfl:     Diclofenac Sodium (VOLTAREN) 1 % gel gel, diclofenac 1 % topical gel  APPLY 2 GRAMS TOPICALLY TO THE AFFECTED AREA(S) FOUR TIMES DAILY, Disp: , Rfl:     EPINEPHrine (EPIPEN) 0.3 MG/0.3ML solution auto-injector injection, epinephrine 0.3 mg/0.3 mL injection, auto-injector  USE AS DIRECTED FOR ACUTE ALLERGIC REACTION, Disp: , Rfl:     erythromycin (ROMYCIN) 5 MG/GM ophthalmic ointment, APPLY ONE RIBBON BY OPTHALMIC ROUTE THREE TIMES DAILY TO THE AFFECTED EYE(S), Disp: , Rfl:     fluocinonide (LIDEX) 0.05 % cream, Apply  topically to the appropriate area as directed Daily., Disp: , Rfl:     fluticasone (FLONASE) 50 MCG/ACT nasal spray, fluticasone propionate 50 mcg/actuation nasal spray,suspension  USE 2 SPRAY(S) IN EACH NOSTRIL ONCE DAILY, Disp: , Rfl:     gabapentin (NEURONTIN) 300 MG capsule, Take 1 capsule by mouth Daily., Disp: , Rfl:     gentamicin (GARAMYCIN) 0.3 % ophthalmic solution, , Disp: , Rfl:     glucose blood test strip, Accu-Chek Mary Plus test strips, Disp: , Rfl:     hepatitis A (Havrix) 1440 EL U/ML vaccine, Havrix (PF) 1,440 DIXON unit/mL intramuscular syringe, Disp: , Rfl:     hydrocortisone 2.5 % lotion, hydrocortisone 2.5 % lotion  APPLY A THIN LAYER TO THE AFFECTED AREA(S) BY TOPICAL ROUTE ONCE DAILY, Disp: , Rfl:     influenza vac split quad (FLUZONE,FLUARIX,AFLURIA,FLULAVAL) 0.5 ML suspension prefilled syringe injection, Fluarix Quad [id number] (PF) 60 mcg (15 mcg x 4)/0.5 mL IM syringe, Disp: , Rfl:     Insulin Aspart (NOVOLOG SC), Inject  under the skin into the appropriate area as directed.  "32 units a.m.    30 units at lunch    40 units at hs, Disp: , Rfl:     Insulin Lispro (HumaLOG KwikPen) 200 UNIT/ML solution pen-injector, , Disp: , Rfl:     Insulin Pen Needle (BD Pen Needle Gavi 2nd Gen) 32G X 4 MM misc, BD Gavi 2nd Gen Pen Needle 32 gauge x 5/32\"  USE 1 PEN NEEDLE 5-7 TIMES DAILY, Disp: , Rfl:     ketoconazole (NIZORAL) 2 % shampoo, ketoconazole 2 % shampoo  APPLY TOPICALLY TO SCALP 2 3 TIMES PER WEEK; APPLY TO DRY SCALP LET IT SIT FOR 15 20 MINUTES THEN RINSE, Disp: , Rfl:     lactulose (CHRONULAC) 10 GM/15ML solution, Take 15-30 mL by mouth 2 (Two) Times a Day. Goal of 2-3 bowel movements per day., Disp: 900 mL, Rfl: 5    lidocaine (XYLOCAINE) 5 % ointment, APPLY TOPICALLY TO AFFECTED AREA(S) 1 4 TIMES DAILY AS NEEDED, Disp: , Rfl:     Mag-G 500 (27 Mg) MG tablet, Take 1 tablet by mouth every night at bedtime., Disp: , Rfl:     magnesium oxide (MAG-OX) 400 MG tablet, 1 tablet., Disp: , Rfl:     meloxicam (MOBIC) 15 MG tablet, meloxicam 15 mg tablet  TAKE 1 TABLET BY MOUTH ONCE DAILY, Disp: , Rfl:     montelukast (SINGULAIR) 10 MG tablet, Take 1 tablet by mouth every night at bedtime., Disp: , Rfl:     mupirocin (BACTROBAN) 2 % ointment, mupirocin 2 % topical ointment, Disp: , Rfl:     ofloxacin (OCUFLOX) 0.3 % ophthalmic solution, , Disp: , Rfl:     pramipexole (MIRAPEX) 0.125 MG tablet, pramipexole 0.125 mg tablet  TAKE 1 TABLET BY MOUTH TWICE DAILY, Disp: , Rfl:     prednisoLONE acetate (PRED FORTE) 1 % ophthalmic suspension, 1 gtt each eye once daily, Disp: , Rfl:     promethazine (PHENERGAN) 25 MG tablet, promethazine 25 mg tablet, Disp: , Rfl:     promethazine-dextromethorphan (PROMETHAZINE-DM) 6.25-15 MG/5ML syrup, promethazine-DM 6.25 mg-15 mg/5 mL oral syrup, Disp: , Rfl:     Provigil 200 MG tablet, , Disp: , Rfl:     Repatha SureClick solution auto-injector SureClick injection, , Disp: , Rfl:     rifAMPin (RIFADIN) 300 MG capsule, TAKE 2 CAPSULES BY MOUTH ONCE DAILY AS DIRECTED, Disp: " , Rfl:     rivastigmine (EXELON) 4.5 MG capsule, rivastigmine 4.5 mg capsule, Disp: , Rfl:     Semaglutide (OZEMPIC, 2 MG/DOSE, SC), Inject  under the skin into the appropriate area as directed 1 (One) Time Per Week., Disp: , Rfl:     simethicone (MYLICON) 80 MG chewable tablet, simethicone 80 mg chewable tablet, Disp: , Rfl:     simvastatin (ZOCOR) 40 MG tablet, 1 tablet., Disp: , Rfl:     SITagliptin (JANUVIA) 100 MG tablet, Januvia 100 mg tablet  TAKE 1 TABLET BY MOUTH ONCE DAILY FOR DIABETES, Disp: , Rfl:     Tdap (Adacel) 5-2-15.5 LF-MCG/0.5 injection, Adacel (Tdap Adolesn/Adult)(PF)2 Lf-(2.5-5-3-5)-5 Lf/0.5 mL IM syringe, Disp: , Rfl:     Tiotropium Bromide Monohydrate (Spiriva Respimat) 1.25 MCG/ACT aerosol solution inhaler, Inhale., Disp: , Rfl:     Tirzepatide (Mounjaro) 2.5 MG/0.5ML solution auto-injector, Inject by subcutaneous route for 28 days., Disp: , Rfl:     traZODone (DESYREL) 50 MG tablet, Take 1-2 tablets by mouth At Night As Needed. for sleep, Disp: , Rfl:     Tremfya 100 MG/ML solution pen-injector, INJECT 1 PEN SUBCUTANEOUSLY ONCE THEN IN 4 WEEKS THEN EVERY 8 WEEKS [L40.0], Disp: , Rfl:     Tresiba FlexTouch 200 UNIT/ML solution pen-injector pen injection, INJECT 50 UNITS SUBCUTANEOUSLY IN THE MORNING AND THEN INJECT 50 UNITS IN THE EVENING FOR DIABETES, Disp: , Rfl:     triamcinolone (KENALOG) 0.025 % cream, APPLY A THIN LAYER TO THE RASH ON THE FACE AND EARS TWICE DAILY AS NEEDED FOR FLARES ONLY, Disp: , Rfl:     Trulicity 0.75 MG/0.5ML solution auto-injector, INJECT 0.75MG SUBCUTANEOUSLY ONCE WEEKLY AS DIRECTED FOR 28 DAYS, Disp: , Rfl:     famotidine (PEPCID) 40 MG tablet, Take 1 tablet by mouth 2 (Two) Times a Day., Disp: 180 tablet, Rfl: 3    pantoprazole (PROTONIX) 40 MG EC tablet, Take 1 tablet by mouth Daily., Disp: 90 tablet, Rfl: 3    riFAXIMin (Xifaxan) 550 MG tablet, Take 1 tablet by mouth 2 (Two) Times a Day., Disp: 180 tablet, Rfl: 3     Allergies     Allergies   Allergen  "Reactions    Pollen Extract Other (See Comments)     Other Reaction(s): Other (See Comments)      Grass, Dander, Dust    Grass, Dander, Dust    Cat Dander Itching and Other (See Comments)    Dust Mite Extract Itching and Other (See Comments)       Social History       Social History     Social History Narrative    Not on file         Objective       /88 (BP Location: Left arm, Patient Position: Sitting, Cuff Size: Adult)   Pulse 88   Ht 182.9 cm (72.01\")   Wt 112 kg (247 lb 3.2 oz)   BMI 33.52 kg/m²       Physical Exam    Results       Result Review :    The following data was reviewed by: JALEN Lerma on 06/26/2025:    12/9/2024 CMP: Creatinine 1.1, alk phos 56, AST 15, ALT 23, total bilirubin 0.59.    6/16/2025 right upper quadrant ultrasound-borderline echogenicity of the liver likely due to fatty infiltration.  This could also be due to chronic inflammation.            Results             Assessment and Plan              Diagnoses and all orders for this visit:    1. Liver cirrhosis secondary to MEHTA (Primary)    2. Hepatic encephalopathy  -     riFAXIMin (Xifaxan) 550 MG tablet; Take 1 tablet by mouth 2 (Two) Times a Day.  Dispense: 180 tablet; Refill: 3    3. Gastroesophageal reflux disease, unspecified whether esophagitis present  -     pantoprazole (PROTONIX) 40 MG EC tablet; Take 1 tablet by mouth Daily.  Dispense: 90 tablet; Refill: 3  -     famotidine (PEPCID) 40 MG tablet; Take 1 tablet by mouth 2 (Two) Times a Day.  Dispense: 180 tablet; Refill: 3        Assessment & Plan  1. MEHTA cirrhosis:  - Condition remains stable as confirmed by a recent ultrasound.  - Scheduled to see Dr. Yanes on 01/15/2026.  - Order placed for an ultrasound to be conducted in December 2025 at [place].    2. Hepatic encephalopathy:  - Reports increased confusion and intermittent use of lactulose due to diarrhea.  - Continues to take Xifaxan.  - Prescription refill for Xifaxan provided with a 90-day " supply and 3 refills sent to pharmacy.    3. Gastroesophageal reflux disease (GERD):  - Pantoprazole mostly controls reflux, but occasional symptoms persist.  - Also taking Pepcid at night.  - Prescription refill for Pepcid provided with a 90-day supply and 3 refills sent to pharmacy.  Cirrhosis:  Etiology - type II diabetes.  Cirrhosis noted during cholecystectomy and biopsy obtained at [place].  He had a fibroscan at [facility] and was told that it was normal.  He is seeing Dr. Yanes yearly.    Ascites:  None on exam.    Varices:  None on EGD 11/2022.    Encephalopathy:  Admits intermittent confusion and insomnia.  He is now taking lactulose routinely and xifaxin.          Follow Up     Follow Up   Return in about 1 year (around 6/26/2026) for Cirrhosis.  Patient was given instructions and counseling regarding his condition or for health maintenance advice. Please see specific information pulled into the AVS if appropriate.

## 2025-07-29 ENCOUNTER — PREP FOR SURGERY (OUTPATIENT)
Dept: OTHER | Facility: HOSPITAL | Age: 60
End: 2025-07-29
Payer: COMMERCIAL

## 2025-07-29 ENCOUNTER — OFFICE VISIT (OUTPATIENT)
Dept: CARDIOLOGY | Facility: CLINIC | Age: 60
End: 2025-07-29
Payer: COMMERCIAL

## 2025-07-29 VITALS
HEART RATE: 99 BPM | HEIGHT: 72 IN | SYSTOLIC BLOOD PRESSURE: 123 MMHG | DIASTOLIC BLOOD PRESSURE: 85 MMHG | WEIGHT: 243 LBS | BODY MASS INDEX: 32.91 KG/M2

## 2025-07-29 DIAGNOSIS — G47.33 OSA (OBSTRUCTIVE SLEEP APNEA): ICD-10-CM

## 2025-07-29 DIAGNOSIS — I51.89 DIASTOLIC DYSFUNCTION: ICD-10-CM

## 2025-07-29 DIAGNOSIS — K74.60 NON-ALCOHOLIC CIRRHOSIS: ICD-10-CM

## 2025-07-29 DIAGNOSIS — R07.89 CHEST PRESSURE: Primary | ICD-10-CM

## 2025-07-29 DIAGNOSIS — E78.2 HYPERLIPEMIA, MIXED: ICD-10-CM

## 2025-07-29 DIAGNOSIS — I20.9 ANGINA PECTORIS: ICD-10-CM

## 2025-07-29 DIAGNOSIS — I10 HYPERTENSION, ESSENTIAL: ICD-10-CM

## 2025-07-29 PROCEDURE — 99204 OFFICE O/P NEW MOD 45 MIN: CPT | Performed by: INTERNAL MEDICINE

## 2025-07-29 PROCEDURE — 1160F RVW MEDS BY RX/DR IN RCRD: CPT | Performed by: INTERNAL MEDICINE

## 2025-07-29 PROCEDURE — 3079F DIAST BP 80-89 MM HG: CPT | Performed by: INTERNAL MEDICINE

## 2025-07-29 PROCEDURE — 3074F SYST BP LT 130 MM HG: CPT | Performed by: INTERNAL MEDICINE

## 2025-07-29 PROCEDURE — 1159F MED LIST DOCD IN RCRD: CPT | Performed by: INTERNAL MEDICINE

## 2025-07-29 RX ORDER — SODIUM CHLORIDE 0.9 % (FLUSH) 0.9 %
10 SYRINGE (ML) INJECTION EVERY 12 HOURS SCHEDULED
OUTPATIENT
Start: 2025-07-29

## 2025-07-29 RX ORDER — ISONIAZID 300 MG/1
300 TABLET ORAL
COMMUNITY

## 2025-07-29 RX ORDER — SODIUM CHLORIDE 0.9 % (FLUSH) 0.9 %
10 SYRINGE (ML) INJECTION AS NEEDED
OUTPATIENT
Start: 2025-07-29

## 2025-07-29 RX ORDER — SODIUM CHLORIDE 9 MG/ML
40 INJECTION, SOLUTION INTRAVENOUS AS NEEDED
OUTPATIENT
Start: 2025-07-29

## 2025-07-29 NOTE — PROGRESS NOTES
Chief Complaint  HTN Heart Disease and Establish Care    Subjective            Edgardo Grayson presents to [facility] CARDIOLOGY  History of Present Illness    60-year-old male.  He has seen a cardiologist in the past.  Apparently many years ago he was told that he had Prinzmetal's angina, this was apparently determined by angiography but I do not have any of those records.  His last visit with a cardiologist was earlier this year from a different group.  The patient states he was dismissed from their care.  He has multiple medical problems including nonalcoholic cirrhosis biopsy-proven, hypertension, mixed hyperlipidemia, diabetes, hypertension, sleep apnea with an implanted inspire device.  He has waves of chest discomfort feels like pressure associated with nausea.  It occurs 3-4 times a month.  His blood pressure has been erratic but sometimes quite high.  He reports a syncopal episode about 4 to 5 weeks ago shortly after getting up from the couch he had transient dizziness and then passed out.  After coming to his blood pressure was in the 180s over 110s.  He reports compliance with his medical therapy.  He had a SPECT perfusion study in March that was read as normal with normal LV function.  His echo shows moderate LVH with grade 1 diastolic dysfunction, his EKG in June showed sinus rhythm with no significant ST changes.    PMH  Past Medical History:   Diagnosis Date    Anxiety     Chronic headaches     Cirrhosis     Depression     Diabetes mellitus     Hyperlipidemia     Hypertension     Liver disease     Cirrhosis     Migraine     Muscle pain     Parkinson disease     Sleep apnea     Viral hepatitis A without hepatic coma          SURGICALHX  Past Surgical History:   Procedure Laterality Date    ANAL FISSURECTOMY      CARPAL TUNNEL RELEASE      CHOLECYSTECTOMY      COLONOSCOPY      COLONOSCOPY N/A 11/29/2022    Procedure: COLONOSCOPY WITH BIOPSIES;  Surgeon: Patsy Carrillo MD;   Location: [facility] ENDOSCOPY;  Service: Gastroenterology;  Laterality: N/A;  NORMAL COLONOSCOPY    ENDOSCOPY N/A 2022    Procedure: ESOPHAGOGASTRODUODENOSCOPY WITH BIOPSY;  Surgeon: Patsy Carrillo MD;  Location: [facility] ENDOSCOPY;  Service: Gastroenterology;  Laterality: N/A;  HIATAL HERNIA    HIP ARTHROPLASTY      X2 Left     LIVER BIOPSY      POSTERIOR LUMBAR/THORACIC SPINE FUSION      ROTATOR CUFF REPAIR      TONSILLECTOMY      UPPER GASTROINTESTINAL ENDOSCOPY          SOC  Social History     Socioeconomic History    Marital status:    Tobacco Use    Smoking status: Former     Current packs/day: 0.00     Types: Cigarettes     Quit date:      Years since quittin.5    Smokeless tobacco: Never   Vaping Use    Vaping status: Never Used   Substance and Sexual Activity    Alcohol use: Not Currently    Drug use: Not Currently    Sexual activity: Defer         FAMHX  Family History   Problem Relation Age of Onset    Malig Hyperthermia Neg Hx           ALLERGY  Allergies   Allergen Reactions    Pollen Extract Other (See Comments)     Other Reaction(s): Other (See Comments)      Grass, Dander, Dust    Grass, Dander, Dust    Cat Dander Itching and Other (See Comments)    Dust Mite Extract Itching and Other (See Comments)        MEDSCURRENT    Current Outpatient Medications:     albuterol sulfate  (90 Base) MCG/ACT inhaler, albuterol sulfate HFA 90 mcg/actuation aerosol inhaler  INHALE 2 PUFFS BY MOUTH EVERY 4 TO 6 HOURS AS NEEDED FOR COUGH WHEEZE OR SHORTNESS OF BREATH USE WITH VORTEX SPACER, Disp: , Rfl:     Alcohol Swabs ([employer] Prep) 70 % pads, USE WITH TREMFYA INJECTION, Disp: , Rfl:     amantadine (SYMMETREL) 100 MG tablet, i po bid, Disp: , Rfl:     Apremilast (Otezla) 30 MG tablet, Otezla 30 mg tablet  TAKE 2 TABLETS BY MOUTH ONCE DAILY, Disp: , Rfl:     aspirin 81 MG EC tablet, Aspir-81 mg tablet,delayed release  Take 1 tablet every day by oral route., Disp: , Rfl:     BD Pen Needle  Gavi 2nd Gen 32G X 4 MM misc, USE 5 TO 7 TIMES A DAY, Disp: , Rfl:     benazepril (LOTENSIN) 10 MG tablet, benazepril 10 mg tablet  TAKE 1 TABLET BY MOUTH ONCE DAILY, Disp: , Rfl:     budesonide-formoterol (SYMBICORT) 160-4.5 MCG/ACT inhaler, Symbicort 160 mcg-4.5 mcg/actuation HFA aerosol inhaler  INHALE 2 PUFFS BY MOUTH EVERY 12 HOURS. USE WITH SPACER. RINSE MOUTH AFTER EACH USE, Disp: , Rfl:     carbidopa-levodopa (SINEMET)  MG per tablet, carbidopa 25 mg-levodopa 100 mg tablet  TAKE 1 TABLET BY MOUTH EVERY DAY AT BEDTIME, Disp: , Rfl:     cetirizine (zyrTEC) 10 MG tablet, cetirizine 10 mg tablet  TAKE 1 TABLET BY MOUTH ONCE DAILY, Disp: , Rfl:     cholecalciferol (VITAMIN D3) 25 MCG (1000 UT) tablet, Take 1 tablet by mouth Daily., Disp: , Rfl:     citalopram (CeleXA) 20 MG tablet, Take 1 tablet by mouth Daily., Disp: , Rfl:     clobetasol (TEMOVATE) 0.05 % external solution, , Disp: , Rfl:     Continuous Blood Gluc Sensor (Dexcom G6 Sensor), , Disp: , Rfl:     Continuous Blood Gluc Transmit (Dexcom G6 Transmitter) misc, See Admin Instructions., Disp: , Rfl:     Cream Base (Saltstable LO) cream, SaltStable LS cream, Disp: , Rfl:     Cyanocobalamin 1000 MCG sublingual tablet, cyanocobalamin (vit B-12) 1,000 mcg sublingual tablet  Place 1 tablet every day by sublingual route., Disp: , Rfl:     Diclofenac Sodium (VOLTAREN) 1 % gel gel, diclofenac 1 % topical gel  APPLY 2 GRAMS TOPICALLY TO THE AFFECTED AREA(S) FOUR TIMES DAILY, Disp: , Rfl:     EPINEPHrine (EPIPEN) 0.3 MG/0.3ML solution auto-injector injection, epinephrine 0.3 mg/0.3 mL injection, auto-injector  USE AS DIRECTED FOR ACUTE ALLERGIC REACTION, Disp: , Rfl:     erythromycin (ROMYCIN) 5 MG/GM ophthalmic ointment, APPLY ONE RIBBON BY OPTHALMIC ROUTE THREE TIMES DAILY TO THE AFFECTED EYE(S), Disp: , Rfl:     famotidine (PEPCID) 40 MG tablet, Take 1 tablet by mouth 2 (Two) Times a Day., Disp: 180 tablet, Rfl: 3    fluocinonide (LIDEX) 0.05 % cream,  "Apply  topically to the appropriate area as directed Daily., Disp: , Rfl:     fluticasone (FLONASE) 50 MCG/ACT nasal spray, fluticasone propionate 50 mcg/actuation nasal spray,suspension  USE 2 SPRAY(S) IN EACH NOSTRIL ONCE DAILY, Disp: , Rfl:     gentamicin (GARAMYCIN) 0.3 % ophthalmic solution, , Disp: , Rfl:     glucose blood test strip, Accu-Chek Mary Plus test strips, Disp: , Rfl:     hepatitis A (Havrix) 1440 EL U/ML vaccine, Havrix (PF) 1,440 DIXON unit/mL intramuscular syringe, Disp: , Rfl:     hydrocortisone 2.5 % lotion, hydrocortisone 2.5 % lotion  APPLY A THIN LAYER TO THE AFFECTED AREA(S) BY TOPICAL ROUTE ONCE DAILY, Disp: , Rfl:     influenza vac split quad (FLUZONE,FLUARIX,AFLURIA,FLULAVAL) 0.5 ML suspension prefilled syringe injection, Fluarix Quad 2136-6256 (PF) 60 mcg (15 mcg x 4)/0.5 mL IM syringe, Disp: , Rfl:     Insulin Aspart (NOVOLOG SC), Inject  under the skin into the appropriate area as directed. 32 units a.m.    30 units at lunch    40 units at hs, Disp: , Rfl:     Insulin Lispro (HumaLOG KwikPen) 200 UNIT/ML solution pen-injector, , Disp: , Rfl:     Insulin Pen Needle (BD Pen Needle Gavi 2nd Gen) 32G X 4 MM misc, BD Gavi 2nd Gen Pen Needle 32 gauge x 5/32\"  USE 1 PEN NEEDLE 5-7 TIMES DAILY, Disp: , Rfl:     isoniazid (NYDRAZID) 300 MG tablet, 1 tablet., Disp: , Rfl:     ketoconazole (NIZORAL) 2 % shampoo, ketoconazole 2 % shampoo  APPLY TOPICALLY TO SCALP 2 3 TIMES PER WEEK; APPLY TO DRY SCALP LET IT SIT FOR 15 20 MINUTES THEN RINSE, Disp: , Rfl:     lactulose (CHRONULAC) 10 GM/15ML solution, Take 15-30 mL by mouth 2 (Two) Times a Day. Goal of 2-3 bowel movements per day., Disp: 900 mL, Rfl: 5    lidocaine (XYLOCAINE) 5 % ointment, APPLY TOPICALLY TO AFFECTED AREA(S) 1 4 TIMES DAILY AS NEEDED, Disp: , Rfl:     Mag-G 500 (27 Mg) MG tablet, Take 1 tablet by mouth every night at bedtime., Disp: , Rfl:     magnesium oxide (MAG-OX) 400 MG tablet, 1 tablet., Disp: , Rfl:     meloxicam (MOBIC) " 15 MG tablet, meloxicam 15 mg tablet  TAKE 1 TABLET BY MOUTH ONCE DAILY, Disp: , Rfl:     montelukast (SINGULAIR) 10 MG tablet, Take 1 tablet by mouth every night at bedtime., Disp: , Rfl:     mupirocin (BACTROBAN) 2 % ointment, mupirocin 2 % topical ointment, Disp: , Rfl:     ofloxacin (OCUFLOX) 0.3 % ophthalmic solution, , Disp: , Rfl:     pantoprazole (PROTONIX) 40 MG EC tablet, Take 1 tablet by mouth Daily., Disp: 90 tablet, Rfl: 3    pramipexole (MIRAPEX) 0.125 MG tablet, pramipexole 0.125 mg tablet  TAKE 1 TABLET BY MOUTH TWICE DAILY, Disp: , Rfl:     prednisoLONE acetate (PRED FORTE) 1 % ophthalmic suspension, 1 gtt each eye once daily, Disp: , Rfl:     promethazine (PHENERGAN) 25 MG tablet, promethazine 25 mg tablet, Disp: , Rfl:     promethazine-dextromethorphan (PROMETHAZINE-DM) 6.25-15 MG/5ML syrup, promethazine-DM 6.25 mg-15 mg/5 mL oral syrup, Disp: , Rfl:     Provigil 200 MG tablet, , Disp: , Rfl:     Repatha SureClick solution auto-injector SureClick injection, , Disp: , Rfl:     rifAMPin (RIFADIN) 300 MG capsule, TAKE 2 CAPSULES BY MOUTH ONCE DAILY AS DIRECTED, Disp: , Rfl:     riFAXIMin (Xifaxan) 550 MG tablet, Take 1 tablet by mouth 2 (Two) Times a Day., Disp: 180 tablet, Rfl: 3    rivastigmine (EXELON) 4.5 MG capsule, rivastigmine 4.5 mg capsule, Disp: , Rfl:     Semaglutide (OZEMPIC, 2 MG/DOSE, SC), Inject  under the skin into the appropriate area as directed 1 (One) Time Per Week., Disp: , Rfl:     simethicone (MYLICON) 80 MG chewable tablet, simethicone 80 mg chewable tablet, Disp: , Rfl:     SITagliptin (JANUVIA) 100 MG tablet, Januvia 100 mg tablet  TAKE 1 TABLET BY MOUTH ONCE DAILY FOR DIABETES, Disp: , Rfl:     Tdap (Adacel) 5-2-15.5 LF-MCG/0.5 injection, Adacel (Tdap Adolesn/Adult)(PF)2 Lf-(2.5-5-3-5)-5 Lf/0.5 mL IM syringe, Disp: , Rfl:     Tiotropium Bromide Monohydrate (Spiriva Respimat) 1.25 MCG/ACT aerosol solution inhaler, Inhale., Disp: , Rfl:     Tirzepatide (Mounjaro) 2.5 MG/0.5ML  "solution auto-injector, Inject by subcutaneous route for 28 days., Disp: , Rfl:     traZODone (DESYREL) 50 MG tablet, Take 1-2 tablets by mouth At Night As Needed. for sleep, Disp: , Rfl:     Tremfya 100 MG/ML solution pen-injector, INJECT 1 PEN SUBCUTANEOUSLY ONCE THEN IN 4 WEEKS THEN EVERY 8 WEEKS [L40.0], Disp: , Rfl:     Tresiba FlexTouch 200 UNIT/ML solution pen-injector pen injection, INJECT 50 UNITS SUBCUTANEOUSLY IN THE MORNING AND THEN INJECT 50 UNITS IN THE EVENING FOR DIABETES, Disp: , Rfl:     triamcinolone (KENALOG) 0.025 % cream, APPLY A THIN LAYER TO THE RASH ON THE FACE AND EARS TWICE DAILY AS NEEDED FOR FLARES ONLY, Disp: , Rfl:     Trulicity 0.75 MG/0.5ML solution auto-injector, INJECT 0.75MG SUBCUTANEOUSLY ONCE WEEKLY AS DIRECTED FOR 28 DAYS, Disp: , Rfl:     gabapentin (NEURONTIN) 300 MG capsule, Take 1 capsule by mouth Daily. (Patient not taking: Reported on 7/29/2025), Disp: , Rfl:       Review of Systems   Constitutional: Positive for malaise/fatigue.   HENT: Negative.     Eyes: Negative.    Cardiovascular:  Positive for chest pain and dyspnea on exertion.   Respiratory:  Positive for shortness of breath and sleep disturbances due to breathing.    Endocrine: Negative.    Hematologic/Lymphatic: Negative.    Skin: Negative.    Musculoskeletal: Negative.    Gastrointestinal:  Positive for nausea.   Genitourinary: Negative.    Neurological: Negative.    Psychiatric/Behavioral: Negative.          Objective     /85   Pulse 99   Ht 182.9 cm (72\")   Wt 110 kg (243 lb)   BMI 32.96 kg/m²       General Appearance:   well developed  well nourished, obese  HENT:   oropharynx moist  lips not cyanotic  Neck:  thyroid not enlarged  supple  Respiratory:  no respiratory distress  normal breath sounds  no rales  Cardiovascular:  no jugular venous distention  regular rhythm  apical impulse normal  S1 normal, S2 normal  no S3, no S4   no murmur  no rub, no thrill  carotid pulses normal; no bruit  pedal " pulses normal  lower extremity edema: none    Musculoskeletal:  no clubbing of fingers.   normocephalic, head atraumatic  Skin:   warm, dry  Psychiatric:  judgement and insight appropriate  normal mood and affect      Result Review :     The following data was reviewed by: Luis Martinez MD on 07/29/2025:              Data reviewed : Primary care records reviewed, laboratory studies reviewed, previous cardiology records reviewed, recent cardiac testing reviewed     Procedures                Assessment and Plan        ASSESSMENT:  Encounter Diagnoses   Name Primary?    Chest pressure Yes    Hypertension, essential     Non-alcoholic cirrhosis     Diastolic dysfunction     Hyperlipemia, mixed     CARYN (obstructive sleep apnea)          PLAN:    1.  Waves of chest pressure, associated with shortness of breath and nausea.  The patient was told over 10 years ago that he may have Prinzmetal's angina.  It has been quite a while since he has had coronary angiography.  His stress imaging study earlier this year was negative for ischemia.  He has multiple cardiovascular risk factors.  I recommend coronary angiography for definitive assessment.  He is agreeable with this plan.  Will get this scheduled  2.  Otherwise I think his symptomatology may be related to a combination of things such as the effect of smoking, obesity, some degree of hypertensive heart disease, with tendency for fluid retention.  His blood pressure today is controlled.  Will continue the ACE inhibitor.  His LV systolic function is preserved.  3.  Nonalcoholic cirrhosis-his statin therapy was stopped and he was started on Repatha because of this, continue this lipid-lowering therapy.  Otherwise he does not show clinical effects of chronic liver disease currently  4.  Mixed hyperlipidemia as above  5.  CARYN-he has an inspire device and follows with sleep medicine    Follow-up will be determined after cardiac catheterization          Patient was  given instructions and counseling regarding his condition or for health maintenance advice. Please see specific information pulled into the AVS if appropriate.             MIKAELA Martinez MD  7/29/2025    14:03 EDT

## 2025-08-04 ENCOUNTER — TELEPHONE (OUTPATIENT)
Dept: CARDIOLOGY | Facility: CLINIC | Age: 60
End: 2025-08-04
Payer: COMMERCIAL

## 2025-08-14 ENCOUNTER — HOSPITAL ENCOUNTER (OUTPATIENT)
Facility: HOSPITAL | Age: 60
Discharge: HOME | End: 2025-08-15
Attending: INTERNAL MEDICINE | Admitting: INTERNAL MEDICINE
Payer: COMMERCIAL

## 2025-08-14 DIAGNOSIS — R07.89 CHEST PRESSURE: ICD-10-CM

## 2025-08-14 DIAGNOSIS — I20.9 ANGINA PECTORIS: ICD-10-CM

## 2025-08-14 PROBLEM — Z98.61 CAD S/P PERCUTANEOUS CORONARY ANGIOPLASTY: Status: ACTIVE | Noted: 2025-08-14

## 2025-08-14 PROBLEM — I25.10 CAD S/P PERCUTANEOUS CORONARY ANGIOPLASTY: Status: ACTIVE | Noted: 2025-08-14

## 2025-08-14 LAB
ACT BLD: 245 SECONDS (ref 89–137)
ACT BLD: 297 SECONDS (ref 89–137)
ANION GAP SERPL CALCULATED.3IONS-SCNC: 7.5 MMOL/L (ref 5–15)
BUN SERPL-MCNC: 16 MG/DL (ref 8–23)
BUN/CREAT SERPL: 21.9 (ref 7–25)
CALCIUM SPEC-SCNC: 9.2 MG/DL (ref 8.6–10.5)
CHLORIDE SERPL-SCNC: 100 MMOL/L (ref 98–107)
CO2 SERPL-SCNC: 25.5 MMOL/L (ref 22–29)
CREAT SERPL-MCNC: 0.73 MG/DL (ref 0.76–1.27)
DEPRECATED RDW RBC AUTO: 46 FL (ref 37–54)
EGFRCR SERPLBLD CKD-EPI 2021: 102.9 ML/MIN/1.73
ERYTHROCYTE [DISTWIDTH] IN BLOOD BY AUTOMATED COUNT: 14.2 % (ref 12.3–15.4)
GLUCOSE BLDC GLUCOMTR-MCNC: 284 MG/DL (ref 70–99)
GLUCOSE BLDC GLUCOMTR-MCNC: 361 MG/DL (ref 70–99)
GLUCOSE SERPL-MCNC: 213 MG/DL (ref 65–99)
HCT VFR BLD AUTO: 37.3 % (ref 37.5–51)
HGB BLD-MCNC: 12.5 G/DL (ref 13–17.7)
INR PPP: 0.92 (ref 0.86–1.15)
MCH RBC QN AUTO: 30.1 PG (ref 26.6–33)
MCHC RBC AUTO-ENTMCNC: 33.5 G/DL (ref 31.5–35.7)
MCV RBC AUTO: 89.9 FL (ref 79–97)
PLATELET # BLD AUTO: 197 10*3/MM3 (ref 140–450)
PMV BLD AUTO: 8.9 FL (ref 6–12)
POTASSIUM SERPL-SCNC: 3.8 MMOL/L (ref 3.5–5.2)
PROTHROMBIN TIME: 12.7 SECONDS (ref 11.8–14.9)
RBC # BLD AUTO: 4.15 10*6/MM3 (ref 4.14–5.8)
SODIUM SERPL-SCNC: 133 MMOL/L (ref 136–145)
WBC NRBC COR # BLD AUTO: 4.51 10*3/MM3 (ref 3.4–10.8)

## 2025-08-14 PROCEDURE — 99152 MOD SED SAME PHYS/QHP 5/>YRS: CPT | Performed by: INTERNAL MEDICINE

## 2025-08-14 PROCEDURE — 92979 ENDOLUMINL IVUS OCT C EA: CPT | Performed by: INTERNAL MEDICINE

## 2025-08-14 PROCEDURE — 92928 PRQ TCAT PLMT NTRAC ST 1 LES: CPT | Performed by: INTERNAL MEDICINE

## 2025-08-14 PROCEDURE — 25510000001 IOPAMIDOL PER 1 ML: Performed by: INTERNAL MEDICINE

## 2025-08-14 PROCEDURE — 25010000002 MORPHINE PER 10 MG: Performed by: INTERNAL MEDICINE

## 2025-08-14 PROCEDURE — 94799 UNLISTED PULMONARY SVC/PX: CPT

## 2025-08-14 PROCEDURE — 93458 L HRT ARTERY/VENTRICLE ANGIO: CPT | Performed by: INTERNAL MEDICINE

## 2025-08-14 PROCEDURE — 99153 MOD SED SAME PHYS/QHP EA: CPT | Performed by: INTERNAL MEDICINE

## 2025-08-14 PROCEDURE — 85027 COMPLETE CBC AUTOMATED: CPT | Performed by: INTERNAL MEDICINE

## 2025-08-14 PROCEDURE — 82948 REAGENT STRIP/BLOOD GLUCOSE: CPT

## 2025-08-14 PROCEDURE — C1725 CATH, TRANSLUMIN NON-LASER: HCPCS | Performed by: INTERNAL MEDICINE

## 2025-08-14 PROCEDURE — C1894 INTRO/SHEATH, NON-LASER: HCPCS | Performed by: INTERNAL MEDICINE

## 2025-08-14 PROCEDURE — 25010000002 MIDAZOLAM PER 1MG: Performed by: INTERNAL MEDICINE

## 2025-08-14 PROCEDURE — 92978 ENDOLUMINL IVUS OCT C 1ST: CPT | Performed by: INTERNAL MEDICINE

## 2025-08-14 PROCEDURE — 94640 AIRWAY INHALATION TREATMENT: CPT

## 2025-08-14 PROCEDURE — C1761: HCPCS | Performed by: INTERNAL MEDICINE

## 2025-08-14 PROCEDURE — 25010000002 FENTANYL CITRATE (PF) 50 MCG/ML SOLUTION: Performed by: INTERNAL MEDICINE

## 2025-08-14 PROCEDURE — 63710000001 INSULIN LISPRO (HUMAN) PER 5 UNITS: Performed by: INTERNAL MEDICINE

## 2025-08-14 PROCEDURE — 92972 PERQ TRLUML CORONRY LITHOTRP: CPT | Performed by: INTERNAL MEDICINE

## 2025-08-14 PROCEDURE — 63710000001 INSULIN GLARGINE PER 5 UNITS: Performed by: INTERNAL MEDICINE

## 2025-08-14 PROCEDURE — C1769 GUIDE WIRE: HCPCS | Performed by: INTERNAL MEDICINE

## 2025-08-14 PROCEDURE — C9600 PERC DRUG-EL COR STENT SING: HCPCS | Performed by: INTERNAL MEDICINE

## 2025-08-14 PROCEDURE — 85610 PROTHROMBIN TIME: CPT | Performed by: INTERNAL MEDICINE

## 2025-08-14 PROCEDURE — 25810000003 SODIUM CHLORIDE 0.9 % SOLUTION: Performed by: INTERNAL MEDICINE

## 2025-08-14 PROCEDURE — 25010000002 ONDANSETRON PER 1 MG: Performed by: INTERNAL MEDICINE

## 2025-08-14 PROCEDURE — C1753 CATH, INTRAVAS ULTRASOUND: HCPCS | Performed by: INTERNAL MEDICINE

## 2025-08-14 PROCEDURE — 85347 COAGULATION TIME ACTIVATED: CPT

## 2025-08-14 PROCEDURE — C1874 STENT, COATED/COV W/DEL SYS: HCPCS | Performed by: INTERNAL MEDICINE

## 2025-08-14 PROCEDURE — 25010000002 LIDOCAINE 2% SOLUTION: Performed by: INTERNAL MEDICINE

## 2025-08-14 PROCEDURE — 25010000002 HEPARIN (PORCINE) PER 1000 UNITS: Performed by: INTERNAL MEDICINE

## 2025-08-14 PROCEDURE — C1887 CATHETER, GUIDING: HCPCS | Performed by: INTERNAL MEDICINE

## 2025-08-14 PROCEDURE — 80048 BASIC METABOLIC PNL TOTAL CA: CPT | Performed by: INTERNAL MEDICINE

## 2025-08-14 DEVICE — EVEROLIMUS-ELUTING PLATINUM CHROMIUM CORONARY STENT SYSTEM
Type: IMPLANTABLE DEVICE | Site: CORONARY | Status: FUNCTIONAL
Brand: SYNERGY™ XD

## 2025-08-14 RX ORDER — GABAPENTIN 300 MG/1
300 CAPSULE ORAL DAILY
Status: DISCONTINUED | OUTPATIENT
Start: 2025-08-14 | End: 2025-08-14

## 2025-08-14 RX ORDER — INSULIN ASPART 100 [IU]/ML
32 INJECTION, SOLUTION INTRAVENOUS; SUBCUTANEOUS EVERY MORNING
COMMUNITY
End: 2025-08-15 | Stop reason: HOSPADM

## 2025-08-14 RX ORDER — ATORVASTATIN CALCIUM 40 MG/1
40 TABLET, FILM COATED ORAL NIGHTLY
Status: DISCONTINUED | OUTPATIENT
Start: 2025-08-14 | End: 2025-08-15 | Stop reason: HOSPADM

## 2025-08-14 RX ORDER — LISINOPRIL 10 MG/1
5 TABLET ORAL
Status: DISCONTINUED | OUTPATIENT
Start: 2025-08-15 | End: 2025-08-15 | Stop reason: HOSPADM

## 2025-08-14 RX ORDER — MIDAZOLAM HYDROCHLORIDE 2 MG/2ML
INJECTION, SOLUTION INTRAMUSCULAR; INTRAVENOUS
Status: DISCONTINUED | OUTPATIENT
Start: 2025-08-14 | End: 2025-08-14 | Stop reason: HOSPADM

## 2025-08-14 RX ORDER — SODIUM CHLORIDE 9 MG/ML
40 INJECTION, SOLUTION INTRAVENOUS AS NEEDED
Status: DISCONTINUED | OUTPATIENT
Start: 2025-08-14 | End: 2025-08-14 | Stop reason: HOSPADM

## 2025-08-14 RX ORDER — RIFAPENTINE 150 MG/1
6 TABLET, FILM COATED ORAL WEEKLY
COMMUNITY

## 2025-08-14 RX ORDER — TRAZODONE HYDROCHLORIDE 50 MG/1
25 TABLET ORAL NIGHTLY PRN
Status: DISCONTINUED | OUTPATIENT
Start: 2025-08-14 | End: 2025-08-14

## 2025-08-14 RX ORDER — ACETAMINOPHEN 325 MG/1
650 TABLET ORAL EVERY 4 HOURS PRN
Status: CANCELLED | OUTPATIENT
Start: 2025-08-14

## 2025-08-14 RX ORDER — ISONIAZID 300 MG/1
300 TABLET ORAL
Status: DISCONTINUED | OUTPATIENT
Start: 2025-08-14 | End: 2025-08-14 | Stop reason: DRUGHIGH

## 2025-08-14 RX ORDER — ASPIRIN 81 MG/1
81 TABLET ORAL DAILY
Status: DISCONTINUED | OUTPATIENT
Start: 2025-08-15 | End: 2025-08-15 | Stop reason: HOSPADM

## 2025-08-14 RX ORDER — ISONIAZID 300 MG/1
900 TABLET ORAL
Status: DISCONTINUED | OUTPATIENT
Start: 2025-08-17 | End: 2025-08-14

## 2025-08-14 RX ORDER — PANTOPRAZOLE SODIUM 40 MG/1
40 TABLET, DELAYED RELEASE ORAL DAILY
Status: DISCONTINUED | OUTPATIENT
Start: 2025-08-15 | End: 2025-08-15 | Stop reason: HOSPADM

## 2025-08-14 RX ORDER — VERAPAMIL HYDROCHLORIDE 2.5 MG/ML
INJECTION INTRAVENOUS
Status: DISCONTINUED | OUTPATIENT
Start: 2025-08-14 | End: 2025-08-14 | Stop reason: HOSPADM

## 2025-08-14 RX ORDER — SODIUM CHLORIDE 0.9 % (FLUSH) 0.9 %
10 SYRINGE (ML) INJECTION EVERY 12 HOURS SCHEDULED
Status: DISCONTINUED | OUTPATIENT
Start: 2025-08-14 | End: 2025-08-14 | Stop reason: HOSPADM

## 2025-08-14 RX ORDER — ISONIAZID 300 MG/1
900 TABLET ORAL
Status: DISCONTINUED | OUTPATIENT
Start: 2025-08-17 | End: 2025-08-15 | Stop reason: HOSPADM

## 2025-08-14 RX ORDER — BUDESONIDE 0.5 MG/2ML
0.5 INHALANT ORAL
Status: DISCONTINUED | OUTPATIENT
Start: 2025-08-14 | End: 2025-08-15 | Stop reason: HOSPADM

## 2025-08-14 RX ORDER — FENTANYL CITRATE 50 UG/ML
INJECTION, SOLUTION INTRAMUSCULAR; INTRAVENOUS
Status: DISCONTINUED | OUTPATIENT
Start: 2025-08-14 | End: 2025-08-14 | Stop reason: HOSPADM

## 2025-08-14 RX ORDER — FAMOTIDINE 20 MG/1
40 TABLET, FILM COATED ORAL 2 TIMES DAILY
Status: DISCONTINUED | OUTPATIENT
Start: 2025-08-14 | End: 2025-08-15 | Stop reason: HOSPADM

## 2025-08-14 RX ORDER — IOPAMIDOL 755 MG/ML
INJECTION, SOLUTION INTRAVASCULAR
Status: DISCONTINUED | OUTPATIENT
Start: 2025-08-14 | End: 2025-08-14 | Stop reason: HOSPADM

## 2025-08-14 RX ORDER — SODIUM CHLORIDE 9 MG/ML
1 INJECTION, SOLUTION INTRAVENOUS CONTINUOUS
Status: CANCELLED | OUTPATIENT
Start: 2025-08-14 | End: 2025-08-14

## 2025-08-14 RX ORDER — TICAGRELOR 90 MG/1
90 TABLET, FILM COATED ORAL EVERY 12 HOURS SCHEDULED
Status: CANCELLED | OUTPATIENT
Start: 2025-08-14

## 2025-08-14 RX ORDER — LIDOCAINE HYDROCHLORIDE 20 MG/ML
INJECTION, SOLUTION INFILTRATION; PERINEURAL
Status: DISCONTINUED | OUTPATIENT
Start: 2025-08-14 | End: 2025-08-14 | Stop reason: HOSPADM

## 2025-08-14 RX ORDER — CITALOPRAM HYDROBROMIDE 20 MG/1
20 TABLET ORAL DAILY
Status: DISCONTINUED | OUTPATIENT
Start: 2025-08-15 | End: 2025-08-15 | Stop reason: HOSPADM

## 2025-08-14 RX ORDER — NITROGLYCERIN 0.4 MG/1
0.4 TABLET SUBLINGUAL
Status: CANCELLED | OUTPATIENT
Start: 2025-08-14

## 2025-08-14 RX ORDER — SODIUM CHLORIDE 0.9 % (FLUSH) 0.9 %
10 SYRINGE (ML) INJECTION AS NEEDED
Status: DISCONTINUED | OUTPATIENT
Start: 2025-08-14 | End: 2025-08-14 | Stop reason: HOSPADM

## 2025-08-14 RX ORDER — ISOSORBIDE MONONITRATE 30 MG/1
30 TABLET, EXTENDED RELEASE ORAL
Status: DISCONTINUED | OUTPATIENT
Start: 2025-08-14 | End: 2025-08-15 | Stop reason: HOSPADM

## 2025-08-14 RX ORDER — INSULIN LISPRO 100 [IU]/ML
25 INJECTION, SOLUTION INTRAVENOUS; SUBCUTANEOUS
Status: DISCONTINUED | OUTPATIENT
Start: 2025-08-14 | End: 2025-08-15 | Stop reason: HOSPADM

## 2025-08-14 RX ORDER — ONDANSETRON 2 MG/ML
INJECTION INTRAMUSCULAR; INTRAVENOUS
Status: DISCONTINUED | OUTPATIENT
Start: 2025-08-14 | End: 2025-08-14 | Stop reason: HOSPADM

## 2025-08-14 RX ORDER — ARFORMOTEROL TARTRATE 15 UG/2ML
15 SOLUTION RESPIRATORY (INHALATION)
Status: DISCONTINUED | OUTPATIENT
Start: 2025-08-14 | End: 2025-08-15 | Stop reason: HOSPADM

## 2025-08-14 RX ORDER — ALBUTEROL SULFATE 0.83 MG/ML
2.5 SOLUTION RESPIRATORY (INHALATION) EVERY 6 HOURS PRN
Status: DISCONTINUED | OUTPATIENT
Start: 2025-08-14 | End: 2025-08-15 | Stop reason: HOSPADM

## 2025-08-14 RX ORDER — TICAGRELOR 90 MG/1
TABLET, FILM COATED ORAL
Status: DISCONTINUED | OUTPATIENT
Start: 2025-08-14 | End: 2025-08-14 | Stop reason: HOSPADM

## 2025-08-14 RX ORDER — HEPARIN SODIUM 1000 [USP'U]/ML
INJECTION, SOLUTION INTRAVENOUS; SUBCUTANEOUS
Status: DISCONTINUED | OUTPATIENT
Start: 2025-08-14 | End: 2025-08-14 | Stop reason: HOSPADM

## 2025-08-14 RX ORDER — MORPHINE SULFATE 2 MG/ML
INJECTION, SOLUTION INTRAMUSCULAR; INTRAVENOUS
Status: DISCONTINUED | OUTPATIENT
Start: 2025-08-14 | End: 2025-08-14 | Stop reason: HOSPADM

## 2025-08-14 RX ORDER — TICAGRELOR 90 MG/1
90 TABLET, FILM COATED ORAL EVERY 12 HOURS SCHEDULED
Status: DISCONTINUED | OUTPATIENT
Start: 2025-08-14 | End: 2025-08-15 | Stop reason: HOSPADM

## 2025-08-14 RX ORDER — INSULIN ASPART 100 [IU]/ML
40 INJECTION, SOLUTION INTRAVENOUS; SUBCUTANEOUS
COMMUNITY

## 2025-08-14 RX ORDER — PYRIDOXINE HCL (VITAMIN B6) 25 MG
25 TABLET ORAL DAILY
COMMUNITY

## 2025-08-14 RX ORDER — MONTELUKAST SODIUM 10 MG/1
10 TABLET ORAL NIGHTLY
Status: DISCONTINUED | OUTPATIENT
Start: 2025-08-14 | End: 2025-08-14

## 2025-08-14 RX ORDER — CARBIDOPA AND LEVODOPA 25; 100 MG/1; MG/1
1 TABLET ORAL DAILY
Status: DISCONTINUED | OUTPATIENT
Start: 2025-08-15 | End: 2025-08-15 | Stop reason: HOSPADM

## 2025-08-14 RX ORDER — ASPIRIN 81 MG/1
TABLET, CHEWABLE ORAL
Status: DISCONTINUED | OUTPATIENT
Start: 2025-08-14 | End: 2025-08-14 | Stop reason: HOSPADM

## 2025-08-14 RX ADMIN — ISOSORBIDE MONONITRATE 30 MG: 30 TABLET, EXTENDED RELEASE ORAL at 18:39

## 2025-08-14 RX ADMIN — SODIUM CHLORIDE 330 ML: 9 INJECTION, SOLUTION INTRAVENOUS at 10:06

## 2025-08-14 RX ADMIN — INSULIN GLARGINE 25 UNITS: 100 INJECTION, SOLUTION SUBCUTANEOUS at 21:10

## 2025-08-14 RX ADMIN — ATORVASTATIN CALCIUM 40 MG: 40 TABLET, FILM COATED ORAL at 21:09

## 2025-08-14 RX ADMIN — TICAGRELOR 90 MG: 90 TABLET ORAL at 21:09

## 2025-08-14 RX ADMIN — FAMOTIDINE 40 MG: 20 TABLET, FILM COATED ORAL at 21:09

## 2025-08-14 RX ADMIN — BUDESONIDE 0.5 MG: 0.5 SUSPENSION RESPIRATORY (INHALATION) at 22:27

## 2025-08-14 RX ADMIN — INSULIN LISPRO 25 UNITS: 100 INJECTION, SOLUTION INTRAVENOUS; SUBCUTANEOUS at 21:10

## 2025-08-14 RX ADMIN — ARFORMOTEROL TARTRATE 15 MCG: 15 SOLUTION RESPIRATORY (INHALATION) at 22:27

## 2025-08-15 VITALS
HEART RATE: 76 BPM | TEMPERATURE: 97.8 F | WEIGHT: 253.31 LBS | DIASTOLIC BLOOD PRESSURE: 68 MMHG | BODY MASS INDEX: 34.31 KG/M2 | HEIGHT: 72 IN | OXYGEN SATURATION: 99 % | SYSTOLIC BLOOD PRESSURE: 112 MMHG | RESPIRATION RATE: 18 BRPM

## 2025-08-15 LAB — GLUCOSE BLDC GLUCOMTR-MCNC: 199 MG/DL (ref 70–99)

## 2025-08-15 PROCEDURE — 63710000001 INSULIN LISPRO (HUMAN) PER 5 UNITS: Performed by: INTERNAL MEDICINE

## 2025-08-15 PROCEDURE — 82948 REAGENT STRIP/BLOOD GLUCOSE: CPT

## 2025-08-15 RX ORDER — SODIUM CHLORIDE 0.9 % (FLUSH) 0.9 %
10 SYRINGE (ML) INJECTION EVERY 12 HOURS SCHEDULED
Status: DISCONTINUED | OUTPATIENT
Start: 2025-08-15 | End: 2025-08-15 | Stop reason: HOSPADM

## 2025-08-15 RX ORDER — TICAGRELOR 90 MG/1
90 TABLET, FILM COATED ORAL EVERY 12 HOURS SCHEDULED
Qty: 180 TABLET | Refills: 3 | Status: SHIPPED | OUTPATIENT
Start: 2025-08-15

## 2025-08-15 RX ORDER — SODIUM CHLORIDE 0.9 % (FLUSH) 0.9 %
10 SYRINGE (ML) INJECTION AS NEEDED
Status: DISCONTINUED | OUTPATIENT
Start: 2025-08-15 | End: 2025-08-15 | Stop reason: HOSPADM

## 2025-08-15 RX ORDER — ISOSORBIDE MONONITRATE 30 MG/1
30 TABLET, EXTENDED RELEASE ORAL
Qty: 90 TABLET | Refills: 3 | Status: SHIPPED | OUTPATIENT
Start: 2025-08-16

## 2025-08-15 RX ORDER — SODIUM CHLORIDE 9 MG/ML
40 INJECTION, SOLUTION INTRAVENOUS AS NEEDED
Status: DISCONTINUED | OUTPATIENT
Start: 2025-08-15 | End: 2025-08-15 | Stop reason: HOSPADM

## 2025-08-15 RX ADMIN — LISINOPRIL 5 MG: 10 TABLET ORAL at 08:00

## 2025-08-15 RX ADMIN — FAMOTIDINE 40 MG: 20 TABLET, FILM COATED ORAL at 08:00

## 2025-08-15 RX ADMIN — CARBIDOPA AND LEVODOPA 1 TABLET: 25; 100 TABLET ORAL at 08:00

## 2025-08-15 RX ADMIN — TICAGRELOR 90 MG: 90 TABLET ORAL at 08:00

## 2025-08-15 RX ADMIN — ASPIRIN 81 MG: 81 TABLET, COATED ORAL at 08:00

## 2025-08-15 RX ADMIN — INSULIN LISPRO 25 UNITS: 100 INJECTION, SOLUTION INTRAVENOUS; SUBCUTANEOUS at 07:58

## 2025-08-15 RX ADMIN — CITALOPRAM HYDROBROMIDE 20 MG: 20 TABLET ORAL at 08:00

## 2025-08-15 RX ADMIN — ISOSORBIDE MONONITRATE 30 MG: 30 TABLET, EXTENDED RELEASE ORAL at 08:00

## 2025-08-15 RX ADMIN — PANTOPRAZOLE SODIUM 40 MG: 40 TABLET, DELAYED RELEASE ORAL at 08:00

## 2025-08-18 ENCOUNTER — OFFICE VISIT (OUTPATIENT)
Dept: CARDIOLOGY | Facility: CLINIC | Age: 60
End: 2025-08-18
Payer: COMMERCIAL

## 2025-08-18 VITALS
SYSTOLIC BLOOD PRESSURE: 108 MMHG | DIASTOLIC BLOOD PRESSURE: 70 MMHG | HEART RATE: 111 BPM | HEIGHT: 72 IN | BODY MASS INDEX: 35.03 KG/M2 | WEIGHT: 258.6 LBS | OXYGEN SATURATION: 95 %

## 2025-08-18 DIAGNOSIS — I10 HYPERTENSION, ESSENTIAL: ICD-10-CM

## 2025-08-18 DIAGNOSIS — M79.605 LEFT LEG PAIN: ICD-10-CM

## 2025-08-18 DIAGNOSIS — M79.605 LEFT LEG PAIN: Primary | ICD-10-CM

## 2025-08-18 DIAGNOSIS — E78.2 HYPERLIPEMIA, MIXED: ICD-10-CM

## 2025-08-18 DIAGNOSIS — I25.10 CORONARY ARTERY DISEASE INVOLVING NATIVE CORONARY ARTERY OF NATIVE HEART WITHOUT ANGINA PECTORIS: ICD-10-CM

## 2025-08-18 PROCEDURE — 3074F SYST BP LT 130 MM HG: CPT | Performed by: NURSE PRACTITIONER

## 2025-08-18 PROCEDURE — 3078F DIAST BP <80 MM HG: CPT | Performed by: NURSE PRACTITIONER

## 2025-08-18 PROCEDURE — 99214 OFFICE O/P EST MOD 30 MIN: CPT | Performed by: NURSE PRACTITIONER

## 2025-08-18 RX ORDER — BACLOFEN 10 MG/1
10 TABLET ORAL 3 TIMES DAILY PRN
Qty: 30 TABLET | Refills: 1 | Status: SHIPPED | OUTPATIENT
Start: 2025-08-18

## 2025-08-22 ENCOUNTER — TELEPHONE (OUTPATIENT)
Dept: CARDIOLOGY | Facility: CLINIC | Age: 60
End: 2025-08-22
Payer: COMMERCIAL

## 2025-08-29 ENCOUNTER — OFFICE VISIT (OUTPATIENT)
Dept: CARDIOLOGY | Facility: CLINIC | Age: 60
End: 2025-08-29
Payer: COMMERCIAL

## 2025-08-29 VITALS
HEIGHT: 72 IN | WEIGHT: 261.8 LBS | SYSTOLIC BLOOD PRESSURE: 136 MMHG | HEART RATE: 79 BPM | DIASTOLIC BLOOD PRESSURE: 81 MMHG | BODY MASS INDEX: 35.46 KG/M2

## 2025-08-29 DIAGNOSIS — I25.10 CORONARY ARTERY DISEASE INVOLVING NATIVE CORONARY ARTERY OF NATIVE HEART WITHOUT ANGINA PECTORIS: ICD-10-CM

## 2025-08-29 DIAGNOSIS — E78.2 HYPERLIPEMIA, MIXED: ICD-10-CM

## 2025-08-29 DIAGNOSIS — I10 HYPERTENSION, ESSENTIAL: Primary | ICD-10-CM

## 2025-08-29 PROCEDURE — 3079F DIAST BP 80-89 MM HG: CPT | Performed by: NURSE PRACTITIONER

## 2025-08-29 PROCEDURE — 1159F MED LIST DOCD IN RCRD: CPT | Performed by: NURSE PRACTITIONER

## 2025-08-29 PROCEDURE — 99214 OFFICE O/P EST MOD 30 MIN: CPT | Performed by: NURSE PRACTITIONER

## 2025-08-29 PROCEDURE — 3075F SYST BP GE 130 - 139MM HG: CPT | Performed by: NURSE PRACTITIONER

## 2025-08-29 PROCEDURE — 1160F RVW MEDS BY RX/DR IN RCRD: CPT | Performed by: NURSE PRACTITIONER

## 2025-08-29 RX ORDER — ISOSORBIDE MONONITRATE 60 MG/1
60 TABLET, EXTENDED RELEASE ORAL
Qty: 90 TABLET | Refills: 3 | Status: SHIPPED | OUTPATIENT
Start: 2025-08-29

## 2025-08-29 RX ORDER — MAGNESIUM OXIDE 500 MG
500 TABLET ORAL
COMMUNITY

## 2025-08-29 RX ORDER — NITROGLYCERIN 0.4 MG/1
0.4 TABLET SUBLINGUAL
Qty: 30 TABLET | Refills: 2 | Status: SHIPPED | OUTPATIENT
Start: 2025-08-29

## 2025-08-29 RX ORDER — AMLODIPINE BESYLATE 2.5 MG/1
2.5 TABLET ORAL DAILY
Qty: 30 TABLET | Refills: 3 | Status: SHIPPED | OUTPATIENT
Start: 2025-08-29

## 2025-08-29 RX ORDER — GALCANEZUMAB 100 MG/ML
INJECTION, SOLUTION SUBCUTANEOUS
COMMUNITY
Start: 2025-08-25

## 2025-08-29 RX ORDER — ISOSORBIDE MONONITRATE 30 MG/1
30 TABLET, EXTENDED RELEASE ORAL DAILY
Qty: 90 TABLET | Refills: 3 | Status: SHIPPED | OUTPATIENT
Start: 2025-08-29

## (undated) DEVICE — PTCA DILATATION CATHETER: Brand: NC EMERGE®

## (undated) DEVICE — GLIDESHEATH SLENDER ACCESS KIT: Brand: GLIDESHEATH SLENDER

## (undated) DEVICE — BLCK/BITE BLOX WO/DENTL/RIM W/STRAP 54F

## (undated) DEVICE — CATH F5INF TLPIGST145 110CM6SH: Brand: INFINITI

## (undated) DEVICE — SOLIDIFIER LIQLOC PLS 1500CC BT

## (undated) DEVICE — CATH BALN INTRAVASC/LITHO C2PLUS 4X12MM

## (undated) DEVICE — LINER SURG CANSTR SXN S/RIGD 1500CC

## (undated) DEVICE — CONN JET HYDRA H20 AUXILIARY DISP

## (undated) DEVICE — MICROSURGICAL DILATATION DEVICE: Brand: WOLVERINE CORONARY CUTTING BALLOON

## (undated) DEVICE — SOL IRRG H2O PL/BG 1000ML STRL

## (undated) DEVICE — CATH GUIDE LAUNCHER EBU3.5 6F 100CM

## (undated) DEVICE — Device: Brand: DEFENDO AIR/WATER/SUCTION AND BIOPSY VALVE

## (undated) DEVICE — RADIFOCUS GLIDEWIRE: Brand: GLIDEWIRE

## (undated) DEVICE — SINGLE-USE BIOPSY FORCEPS: Brand: RADIAL JAW 4

## (undated) DEVICE — GW FC FLOP/TP .035 260CM 3MM

## (undated) DEVICE — Device: Brand: OMNIWIRE PRESSURE GUIDE WIRE

## (undated) DEVICE — RADIFOCUS OPTITORQUE ANGIOGRAPHIC CATHETER: Brand: OPTITORQUE

## (undated) DEVICE — RUNTHROUGH NS EXTRA FLOPPY PTCA GUIDEWIRE: Brand: RUNTHROUGH

## (undated) DEVICE — Device

## (undated) DEVICE — CATH IMG IVUS EAGLE EYE PLATIN RX DIGITAL .014IN 5FR